# Patient Record
Sex: FEMALE | Race: WHITE | Employment: FULL TIME | URBAN - NONMETROPOLITAN AREA
[De-identification: names, ages, dates, MRNs, and addresses within clinical notes are randomized per-mention and may not be internally consistent; named-entity substitution may affect disease eponyms.]

---

## 2017-03-22 ENCOUNTER — OFFICE VISIT (OUTPATIENT)
Dept: FAMILY MEDICINE CLINIC | Age: 27
End: 2017-03-22
Payer: MEDICAID

## 2017-03-22 VITALS
BODY MASS INDEX: 33.49 KG/M2 | HEIGHT: 63 IN | SYSTOLIC BLOOD PRESSURE: 110 MMHG | WEIGHT: 189 LBS | HEART RATE: 100 BPM | RESPIRATION RATE: 16 BRPM | DIASTOLIC BLOOD PRESSURE: 80 MMHG

## 2017-03-22 DIAGNOSIS — R35.0 FREQUENT URINATION: ICD-10-CM

## 2017-03-22 DIAGNOSIS — F41.1 GENERALIZED ANXIETY DISORDER: Primary | ICD-10-CM

## 2017-03-22 DIAGNOSIS — Z13.1 SCREENING FOR DIABETES MELLITUS: ICD-10-CM

## 2017-03-22 DIAGNOSIS — Z11.4 SCREENING FOR HIV (HUMAN IMMUNODEFICIENCY VIRUS): ICD-10-CM

## 2017-03-22 DIAGNOSIS — Z23 NEED FOR TDAP VACCINATION: ICD-10-CM

## 2017-03-22 LAB
BILIRUBIN, POC: NORMAL
BLOOD URINE, POC: NORMAL
CLARITY, POC: CLEAR
COLOR, POC: YELLOW
GLUCOSE URINE, POC: NORMAL
HBA1C MFR BLD: 5 %
KETONES, POC: NORMAL
LEUKOCYTE EST, POC: NORMAL
NITRITE, POC: NORMAL
PH, POC: 5
PROTEIN, POC: NORMAL
SPECIFIC GRAVITY, POC: 1.02
UROBILINOGEN, POC: 0.2

## 2017-03-22 PROCEDURE — 90715 TDAP VACCINE 7 YRS/> IM: CPT | Performed by: FAMILY MEDICINE

## 2017-03-22 PROCEDURE — 81003 URINALYSIS AUTO W/O SCOPE: CPT | Performed by: FAMILY MEDICINE

## 2017-03-22 PROCEDURE — 90471 IMMUNIZATION ADMIN: CPT | Performed by: FAMILY MEDICINE

## 2017-03-22 PROCEDURE — 83036 HEMOGLOBIN GLYCOSYLATED A1C: CPT | Performed by: FAMILY MEDICINE

## 2017-03-22 PROCEDURE — 99203 OFFICE O/P NEW LOW 30 MIN: CPT | Performed by: FAMILY MEDICINE

## 2017-03-22 RX ORDER — CLONAZEPAM 0.5 MG/1
0.5 TABLET ORAL 2 TIMES DAILY
Qty: 30 TABLET | Refills: 3 | Status: SHIPPED | OUTPATIENT
Start: 2017-03-22 | End: 2017-05-23 | Stop reason: ALTCHOICE

## 2017-03-22 RX ORDER — BUSPIRONE HYDROCHLORIDE 10 MG/1
10 TABLET ORAL 3 TIMES DAILY
Qty: 30 TABLET | Refills: 5 | Status: SHIPPED | OUTPATIENT
Start: 2017-03-22 | End: 2017-05-23 | Stop reason: DRUGHIGH

## 2017-03-22 RX ORDER — CLONAZEPAM 0.5 MG/1
0.5 TABLET ORAL 2 TIMES DAILY PRN
Qty: 30 TABLET | Refills: 1 | Status: SHIPPED | OUTPATIENT
Start: 2017-03-22 | End: 2017-05-23 | Stop reason: SDUPTHER

## 2017-03-22 ASSESSMENT — ENCOUNTER SYMPTOMS
WHEEZING: 0
SINUS PRESSURE: 0
FACIAL SWELLING: 0
NAUSEA: 0
VOMITING: 0
SHORTNESS OF BREATH: 0
RHINORRHEA: 0
BLOOD IN STOOL: 0
BACK PAIN: 0
PHOTOPHOBIA: 0
CHEST TIGHTNESS: 0
EYE DISCHARGE: 0
TROUBLE SWALLOWING: 0
COLOR CHANGE: 0
CHOKING: 0
ABDOMINAL PAIN: 0
APNEA: 0
ANAL BLEEDING: 0
CONSTIPATION: 0
SORE THROAT: 0
EYE PAIN: 0
EYE ITCHING: 0
STRIDOR: 0
COUGH: 0
ABDOMINAL DISTENTION: 0
RECTAL PAIN: 0
ALLERGIC/IMMUNOLOGIC NEGATIVE: 1
EYE REDNESS: 0
VOICE CHANGE: 0
DIARRHEA: 0

## 2017-03-23 ENCOUNTER — HOSPITAL ENCOUNTER (OUTPATIENT)
Age: 27
Discharge: HOME | End: 2017-03-23
Payer: MEDICAID

## 2017-03-23 DIAGNOSIS — Z11.4 SCREENING FOR HIV (HUMAN IMMUNODEFICIENCY VIRUS): ICD-10-CM

## 2017-03-23 LAB
HIV AG/AB: NONREACTIVE
TSH SERPL DL<=0.05 MIU/L-ACNC: 3.41 MIU/L (ref 0.3–5)

## 2017-03-23 PROCEDURE — 87389 HIV-1 AG W/HIV-1&-2 AB AG IA: CPT

## 2017-03-23 PROCEDURE — 84443 ASSAY THYROID STIM HORMONE: CPT

## 2017-03-23 PROCEDURE — 36415 COLL VENOUS BLD VENIPUNCTURE: CPT

## 2017-05-23 ENCOUNTER — OFFICE VISIT (OUTPATIENT)
Dept: FAMILY MEDICINE CLINIC | Age: 27
End: 2017-05-23
Payer: MEDICAID

## 2017-05-23 VITALS
WEIGHT: 190 LBS | HEART RATE: 92 BPM | SYSTOLIC BLOOD PRESSURE: 126 MMHG | DIASTOLIC BLOOD PRESSURE: 68 MMHG | BODY MASS INDEX: 33.66 KG/M2 | HEIGHT: 63 IN

## 2017-05-23 DIAGNOSIS — F41.1 GENERALIZED ANXIETY DISORDER: Primary | ICD-10-CM

## 2017-05-23 PROCEDURE — 99213 OFFICE O/P EST LOW 20 MIN: CPT | Performed by: FAMILY MEDICINE

## 2017-05-23 RX ORDER — CLONAZEPAM 0.5 MG/1
0.5 TABLET ORAL 2 TIMES DAILY
Qty: 30 TABLET | Refills: 0 | Status: SHIPPED | OUTPATIENT
Start: 2017-05-23 | End: 2017-08-29

## 2017-05-23 RX ORDER — BUSPIRONE HYDROCHLORIDE 15 MG/1
15 TABLET ORAL 3 TIMES DAILY
Qty: 90 TABLET | Refills: 2 | Status: SHIPPED | OUTPATIENT
Start: 2017-05-23 | End: 2017-08-29 | Stop reason: SINTOL

## 2017-05-23 ASSESSMENT — ENCOUNTER SYMPTOMS
COLOR CHANGE: 0
WHEEZING: 0
VOICE CHANGE: 0
CHOKING: 0
CONSTIPATION: 0
FACIAL SWELLING: 0
RECTAL PAIN: 0
EYE REDNESS: 0
APNEA: 0
EYE DISCHARGE: 0
VOMITING: 0
RHINORRHEA: 0
CHEST TIGHTNESS: 0
EYE PAIN: 0
SORE THROAT: 0
SINUS PRESSURE: 0
ABDOMINAL PAIN: 0
COUGH: 0
BLOOD IN STOOL: 0
DIARRHEA: 0
ANAL BLEEDING: 0
NAUSEA: 0
ABDOMINAL DISTENTION: 0
PHOTOPHOBIA: 0
STRIDOR: 0
BACK PAIN: 0
SHORTNESS OF BREATH: 0
ALLERGIC/IMMUNOLOGIC NEGATIVE: 1
TROUBLE SWALLOWING: 0
EYE ITCHING: 0

## 2017-05-23 ASSESSMENT — PATIENT HEALTH QUESTIONNAIRE - PHQ9
1. LITTLE INTEREST OR PLEASURE IN DOING THINGS: 0
SUM OF ALL RESPONSES TO PHQ9 QUESTIONS 1 & 2: 0
SUM OF ALL RESPONSES TO PHQ QUESTIONS 1-9: 0
2. FEELING DOWN, DEPRESSED OR HOPELESS: 0

## 2017-06-16 ENCOUNTER — TELEPHONE (OUTPATIENT)
Dept: FAMILY MEDICINE CLINIC | Age: 27
End: 2017-06-16

## 2017-06-30 ENCOUNTER — HOSPITAL ENCOUNTER (EMERGENCY)
Age: 27
Discharge: HOME | End: 2017-06-30
Attending: EMERGENCY MEDICINE
Payer: COMMERCIAL

## 2017-06-30 ENCOUNTER — TELEPHONE (OUTPATIENT)
Dept: FAMILY MEDICINE CLINIC | Age: 27
End: 2017-06-30

## 2017-06-30 VITALS
RESPIRATION RATE: 16 BRPM | OXYGEN SATURATION: 95 % | DIASTOLIC BLOOD PRESSURE: 75 MMHG | HEART RATE: 86 BPM | TEMPERATURE: 98.5 F | SYSTOLIC BLOOD PRESSURE: 111 MMHG

## 2017-06-30 DIAGNOSIS — F41.1 ANXIETY STATE: Primary | ICD-10-CM

## 2017-06-30 PROCEDURE — 93005 ELECTROCARDIOGRAM TRACING: CPT

## 2017-06-30 PROCEDURE — 6370000000 HC RX 637 (ALT 250 FOR IP): Performed by: EMERGENCY MEDICINE

## 2017-06-30 PROCEDURE — 99283 EMERGENCY DEPT VISIT LOW MDM: CPT

## 2017-06-30 RX ORDER — LORAZEPAM 1 MG/1
1 TABLET ORAL EVERY 8 HOURS PRN
Qty: 3 TABLET | Refills: 0 | Status: SHIPPED | OUTPATIENT
Start: 2017-06-30 | End: 2017-07-01

## 2017-06-30 RX ORDER — LORAZEPAM 0.5 MG/1
1 TABLET ORAL ONCE
Status: COMPLETED | OUTPATIENT
Start: 2017-06-30 | End: 2017-06-30

## 2017-06-30 RX ADMIN — LORAZEPAM 1 MG: 0.5 TABLET ORAL at 12:46

## 2017-06-30 ASSESSMENT — ENCOUNTER SYMPTOMS
EYES NEGATIVE: 1
GASTROINTESTINAL NEGATIVE: 1
ALLERGIC/IMMUNOLOGIC NEGATIVE: 1
HYPERVENTILATION: 0
RESPIRATORY NEGATIVE: 1
ABDOMINAL PAIN: 0

## 2017-07-05 LAB
EKG ATRIAL RATE: 92 BPM
EKG P AXIS: 47 DEGREES
EKG P-R INTERVAL: 174 MS
EKG Q-T INTERVAL: 384 MS
EKG QRS DURATION: 82 MS
EKG QTC CALCULATION (BAZETT): 474 MS
EKG R AXIS: 1 DEGREES
EKG T AXIS: 13 DEGREES
EKG VENTRICULAR RATE: 92 BPM

## 2017-08-29 ENCOUNTER — OFFICE VISIT (OUTPATIENT)
Dept: FAMILY MEDICINE CLINIC | Age: 27
End: 2017-08-29
Payer: COMMERCIAL

## 2017-08-29 VITALS — HEIGHT: 63 IN | BODY MASS INDEX: 32.25 KG/M2 | WEIGHT: 182 LBS | RESPIRATION RATE: 16 BRPM

## 2017-08-29 DIAGNOSIS — R55 VASOVAGAL SYNCOPE: Primary | ICD-10-CM

## 2017-08-29 PROCEDURE — 99213 OFFICE O/P EST LOW 20 MIN: CPT | Performed by: FAMILY MEDICINE

## 2017-08-29 ASSESSMENT — ENCOUNTER SYMPTOMS
VOMITING: 0
CHOKING: 0
BLOOD IN STOOL: 0
VOICE CHANGE: 0
EYE DISCHARGE: 0
ABDOMINAL PAIN: 0
SHORTNESS OF BREATH: 0
TROUBLE SWALLOWING: 0
STRIDOR: 0
COLOR CHANGE: 0
DIARRHEA: 0
SORE THROAT: 0
SINUS PRESSURE: 0
CHEST TIGHTNESS: 0
EYE ITCHING: 0
BOWEL INCONTINENCE: 0
APNEA: 0
BACK PAIN: 0
RHINORRHEA: 0
FACIAL SWELLING: 0
PHOTOPHOBIA: 0
ABDOMINAL DISTENTION: 0
NAUSEA: 0
EYE PAIN: 0
ANAL BLEEDING: 0
WHEEZING: 0
EYE REDNESS: 0
ALLERGIC/IMMUNOLOGIC NEGATIVE: 1
RECTAL PAIN: 0
VISUAL CHANGE: 0
COUGH: 0
CONSTIPATION: 0

## 2018-04-10 ENCOUNTER — HOSPITAL ENCOUNTER (EMERGENCY)
Age: 28
Discharge: HOME | End: 2018-04-11
Attending: EMERGENCY MEDICINE
Payer: COMMERCIAL

## 2018-04-10 VITALS
DIASTOLIC BLOOD PRESSURE: 79 MMHG | RESPIRATION RATE: 20 BRPM | OXYGEN SATURATION: 98 % | HEART RATE: 84 BPM | TEMPERATURE: 97.5 F | SYSTOLIC BLOOD PRESSURE: 112 MMHG

## 2018-04-10 DIAGNOSIS — K02.9 DENTAL CARIES: Primary | ICD-10-CM

## 2018-04-10 DIAGNOSIS — K08.89 PAIN, DENTAL: ICD-10-CM

## 2018-04-10 PROCEDURE — 99282 EMERGENCY DEPT VISIT SF MDM: CPT

## 2018-04-10 PROCEDURE — 6370000000 HC RX 637 (ALT 250 FOR IP): Performed by: EMERGENCY MEDICINE

## 2018-04-10 PROCEDURE — 6370000000 HC RX 637 (ALT 250 FOR IP)

## 2018-04-10 RX ORDER — AMOXICILLIN 500 MG/1
500 CAPSULE ORAL 3 TIMES DAILY
Qty: 30 CAPSULE | Refills: 0 | Status: SHIPPED | OUTPATIENT
Start: 2018-04-10 | End: 2018-04-20

## 2018-04-10 RX ORDER — HYDROCODONE BITARTRATE AND ACETAMINOPHEN 5; 325 MG/1; MG/1
2 TABLET ORAL ONCE
Status: DISCONTINUED | OUTPATIENT
Start: 2018-04-11 | End: 2018-04-11 | Stop reason: HOSPADM

## 2018-04-10 RX ORDER — AMOXICILLIN 500 MG/1
500 CAPSULE ORAL ONCE
Status: COMPLETED | OUTPATIENT
Start: 2018-04-11 | End: 2018-04-10

## 2018-04-10 RX ADMIN — AMOXICILLIN 500 MG: 500 CAPSULE ORAL at 23:58

## 2018-04-10 RX ADMIN — LIDOCAINE HYDROCHLORIDE 15 ML: 20 SOLUTION ORAL; TOPICAL at 23:58

## 2018-04-10 ASSESSMENT — PAIN SCALES - GENERAL
PAINLEVEL_OUTOF10: 10
PAINLEVEL_OUTOF10: 10

## 2018-04-11 PROCEDURE — 6370000000 HC RX 637 (ALT 250 FOR IP): Performed by: EMERGENCY MEDICINE

## 2018-04-11 RX ORDER — TRAMADOL HYDROCHLORIDE 50 MG/1
50 TABLET ORAL ONCE
Status: COMPLETED | OUTPATIENT
Start: 2018-04-11 | End: 2018-04-11

## 2018-04-11 RX ADMIN — TRAMADOL HYDROCHLORIDE 50 MG: 50 TABLET, FILM COATED ORAL at 00:05

## 2018-04-11 ASSESSMENT — PAIN SCALES - GENERAL: PAINLEVEL_OUTOF10: 10

## 2018-07-13 ENCOUNTER — HOSPITAL ENCOUNTER (EMERGENCY)
Age: 28
Discharge: HOME | End: 2018-07-13
Attending: INTERNAL MEDICINE
Payer: MEDICAID

## 2018-07-13 VITALS
OXYGEN SATURATION: 99 % | HEIGHT: 66 IN | WEIGHT: 179.9 LBS | BODY MASS INDEX: 28.91 KG/M2 | SYSTOLIC BLOOD PRESSURE: 96 MMHG | TEMPERATURE: 98.2 F | DIASTOLIC BLOOD PRESSURE: 67 MMHG | RESPIRATION RATE: 13 BRPM | HEART RATE: 72 BPM

## 2018-07-13 DIAGNOSIS — F41.1 ANXIETY STATE: Primary | ICD-10-CM

## 2018-07-13 DIAGNOSIS — Z72.0 TOBACCO ABUSE: ICD-10-CM

## 2018-07-13 LAB
ABSOLUTE EOS #: 0.3 K/UL (ref 0–0.4)
ABSOLUTE IMMATURE GRANULOCYTE: NORMAL K/UL (ref 0–0.3)
ABSOLUTE LYMPH #: 2.5 K/UL (ref 1–4.8)
ABSOLUTE MONO #: 0.6 K/UL (ref 0–1)
ALBUMIN SERPL-MCNC: 4.1 G/DL (ref 3.5–5.2)
ALBUMIN/GLOBULIN RATIO: ABNORMAL (ref 1–2.5)
ALP BLD-CCNC: 92 U/L (ref 35–104)
ALT SERPL-CCNC: 18 U/L (ref 5–33)
ANION GAP SERPL CALCULATED.3IONS-SCNC: 10 MMOL/L (ref 9–17)
AST SERPL-CCNC: 20 U/L
BASOPHILS # BLD: 0 % (ref 0–2)
BASOPHILS ABSOLUTE: 0 K/UL (ref 0–0.2)
BILIRUB SERPL-MCNC: <0.1 MG/DL (ref 0.3–1.2)
BUN BLDV-MCNC: 9 MG/DL (ref 6–20)
BUN/CREAT BLD: 12 (ref 9–20)
CALCIUM SERPL-MCNC: 9.8 MG/DL (ref 8.6–10.4)
CHLORIDE BLD-SCNC: 103 MMOL/L (ref 98–107)
CO2: 27 MMOL/L (ref 20–31)
CREAT SERPL-MCNC: 0.74 MG/DL (ref 0.5–0.9)
DIFFERENTIAL TYPE: YES
EKG ATRIAL RATE: 90 BPM
EKG P AXIS: 46 DEGREES
EKG P-R INTERVAL: 196 MS
EKG Q-T INTERVAL: 362 MS
EKG QRS DURATION: 84 MS
EKG QTC CALCULATION (BAZETT): 442 MS
EKG R AXIS: 0 DEGREES
EKG T AXIS: 30 DEGREES
EKG VENTRICULAR RATE: 90 BPM
EOSINOPHILS RELATIVE PERCENT: 4 % (ref 0–5)
GFR AFRICAN AMERICAN: >60 ML/MIN
GFR NON-AFRICAN AMERICAN: >60 ML/MIN
GFR SERPL CREATININE-BSD FRML MDRD: ABNORMAL ML/MIN/{1.73_M2}
GFR SERPL CREATININE-BSD FRML MDRD: ABNORMAL ML/MIN/{1.73_M2}
GLUCOSE BLD-MCNC: 75 MG/DL (ref 70–99)
HCT VFR BLD CALC: 38.3 % (ref 36–46)
HEMOGLOBIN: 13 G/DL (ref 12–16)
IMMATURE GRANULOCYTES: NORMAL %
LYMPHOCYTES # BLD: 31 % (ref 15–40)
MCH RBC QN AUTO: 30.1 PG (ref 26–34)
MCHC RBC AUTO-ENTMCNC: 33.9 G/DL (ref 31–37)
MCV RBC AUTO: 88.9 FL (ref 80–100)
MONOCYTES # BLD: 7 % (ref 4–8)
NRBC AUTOMATED: NORMAL PER 100 WBC
PDW BLD-RTO: 13.9 % (ref 12.1–15.2)
PLATELET # BLD: 300 K/UL (ref 140–450)
PLATELET ESTIMATE: NORMAL
PMV BLD AUTO: NORMAL FL (ref 6–12)
POTASSIUM SERPL-SCNC: 4 MMOL/L (ref 3.7–5.3)
RBC # BLD: 4.31 M/UL (ref 4–5.2)
RBC # BLD: NORMAL 10*6/UL
SEG NEUTROPHILS: 58 % (ref 47–75)
SEGMENTED NEUTROPHILS ABSOLUTE COUNT: 4.8 K/UL (ref 2.5–7)
SODIUM BLD-SCNC: 140 MMOL/L (ref 135–144)
TOTAL PROTEIN: 7 G/DL (ref 6.4–8.3)
TROPONIN INTERP: NORMAL
TROPONIN T: <0.03 NG/ML
WBC # BLD: 8.3 K/UL (ref 3.5–11)
WBC # BLD: NORMAL 10*3/UL

## 2018-07-13 PROCEDURE — 2580000003 HC RX 258: Performed by: INTERNAL MEDICINE

## 2018-07-13 PROCEDURE — 96374 THER/PROPH/DIAG INJ IV PUSH: CPT

## 2018-07-13 PROCEDURE — 93005 ELECTROCARDIOGRAM TRACING: CPT

## 2018-07-13 PROCEDURE — 36415 COLL VENOUS BLD VENIPUNCTURE: CPT

## 2018-07-13 PROCEDURE — 85025 COMPLETE CBC W/AUTO DIFF WBC: CPT

## 2018-07-13 PROCEDURE — 6360000002 HC RX W HCPCS: Performed by: INTERNAL MEDICINE

## 2018-07-13 PROCEDURE — 84484 ASSAY OF TROPONIN QUANT: CPT

## 2018-07-13 PROCEDURE — 99285 EMERGENCY DEPT VISIT HI MDM: CPT

## 2018-07-13 PROCEDURE — 80053 COMPREHEN METABOLIC PANEL: CPT

## 2018-07-13 RX ORDER — IBUPROFEN 200 MG
400 TABLET ORAL EVERY 6 HOURS PRN
COMMUNITY

## 2018-07-13 RX ORDER — LORAZEPAM 2 MG/ML
0.5 INJECTION INTRAMUSCULAR ONCE
Status: COMPLETED | OUTPATIENT
Start: 2018-07-13 | End: 2018-07-13

## 2018-07-13 RX ORDER — ALPRAZOLAM 0.25 MG/1
0.25 TABLET ORAL 3 TIMES DAILY PRN
Qty: 10 TABLET | Refills: 0 | Status: SHIPPED | OUTPATIENT
Start: 2018-07-13 | End: 2018-08-12

## 2018-07-13 RX ORDER — 0.9 % SODIUM CHLORIDE 0.9 %
500 INTRAVENOUS SOLUTION INTRAVENOUS ONCE
Status: DISCONTINUED | OUTPATIENT
Start: 2018-07-13 | End: 2018-07-13

## 2018-07-13 RX ORDER — 0.9 % SODIUM CHLORIDE 0.9 %
1000 INTRAVENOUS SOLUTION INTRAVENOUS ONCE
Status: COMPLETED | OUTPATIENT
Start: 2018-07-13 | End: 2018-07-13

## 2018-07-13 RX ADMIN — SODIUM CHLORIDE 1000 ML: 9 INJECTION, SOLUTION INTRAVENOUS at 20:46

## 2018-07-13 RX ADMIN — LORAZEPAM 0.5 MG: 2 INJECTION INTRAMUSCULAR; INTRAVENOUS at 21:14

## 2018-07-13 ASSESSMENT — PAIN DESCRIPTION - PAIN TYPE: TYPE: ACUTE PAIN

## 2018-07-13 ASSESSMENT — PAIN DESCRIPTION - FREQUENCY: FREQUENCY: INTERMITTENT

## 2018-07-13 ASSESSMENT — PAIN DESCRIPTION - ORIENTATION: ORIENTATION: MID

## 2018-07-13 ASSESSMENT — PAIN DESCRIPTION - DIRECTION: RADIATING_TOWARDS: DENIES

## 2018-07-13 ASSESSMENT — PAIN DESCRIPTION - LOCATION: LOCATION: CHEST

## 2018-07-13 ASSESSMENT — PAIN DESCRIPTION - ONSET: ONSET: GRADUAL

## 2018-07-13 ASSESSMENT — PAIN DESCRIPTION - PROGRESSION: CLINICAL_PROGRESSION: NOT CHANGED

## 2018-07-13 ASSESSMENT — PAIN SCALES - GENERAL: PAINLEVEL_OUTOF10: 6

## 2018-07-13 ASSESSMENT — PAIN DESCRIPTION - DESCRIPTORS: DESCRIPTORS: POUNDING

## 2018-07-13 NOTE — ED NOTES
This nurse attempted IV starts x2, both unsuccessful. Pt tolerated well. Dr. Karri Good notified.        [address], RN  07/13/18 2520

## 2018-07-13 NOTE — ED NOTES
Divya chest pain started around 2 pm while painting. Divya took nap for couple hours then woke up and boyfriend called squad since she still didn't feel well. Divya has shortness of breath and some lightheadedness today. States \"I feel like I should be worried about something\". Divya has anxiety issues.      Kailee Zhong RN  07/13/18 8355

## 2018-07-14 NOTE — ED PROVIDER NOTES
oropharyngeal exudate noted. Eyes: Conjunctivae and EOM are normal. Pupils are equal, round, and reactive to light. No scleral icterus. Neck: Normal range of motion. Neck supple. No tracheal deviation present. Cardiovascular: Normal rate, regular rhythm, normal heart sounds and intact distal pulses. Exam reveals no gallop and no friction rub. No murmur heard. Pulmonary/Chest: Effort normal and breath sounds are symmetric and normal. No respiratory distress. There are no wheezes, rales or rhonchi. No tenderness is exhibited. Abdominal: Soft. Bowel sounds are normal. No distension or no mass exhibitted. There is no tenderness, rebound, rigidity or guarding. Genitourinary:   No CVA tenderness   Musculoskeletal: Normal range of motion. No edema, tenderness or deformity. Lymphadenopathy:  No cervical adenopathy. Neurological:   alert and oriented to person, place, and time. Age-appropriate behavior. Reflexes are normal.  There are no cranial nerve deficits. Normal muscle tone, motor and sensory function exhibitted. Coordination normal.   Skin: Skin is warm and dry. No rash noted. No diaphoresis. No erythema. No pallor. Psychiatric:  Anxious, normal affect. Behavior is age-appropriate and normal. Judgment and thought content normal.       DIAGNOSTIC RESULTS     EKG: All EKG's are interpreted by the Emergency Department Physician who either signs or Co-signs this chart in the absence of a cardiologist.    Twelve-lead EKG performed at 1736 shows sinus rhythm with sinus arrhythmia. Ventricular rate is 90 bpm. There is normal [place] interval, QS duration, QT, QTC and axis. Nonspecific changes are noted in the anterior waves.       RADIOLOGY:   Non-plain film images such as CT, Ultrasound and MRI are read by the radiologist.   Interpretation per the Radiologist below, if available at the time of this note:    No orders to display         ED BEDSIDE ULTRASOUND:   Performed by ED Physician - none    LABS:  Labs Reviewed   COMPREHENSIVE METABOLIC PANEL - Abnormal; Notable for the following:        Result Value    Total Bilirubin <0.10 (*)     All other components within normal limits   CBC WITH AUTO DIFFERENTIAL   TROPONIN   PREGNANCY, URINE       All other labs were within normal range or not returned as of this dictation. EMERGENCY DEPARTMENT COURSE and DIFFERENTIAL DIAGNOSIS/MDM:   Vitals:    Vitals:    07/13/18 1833 07/13/18 1848 07/13/18 2104 07/13/18 2118   BP: (!) 91/27 (!) 93/58 (!) 98/58 96/67   Pulse: 85 81 73 72   Resp: 16 15 15 13   Temp:       TempSrc:       SpO2: 100% 99% 100% 99%   Weight:       Height:           Noted    MDM    CRITICAL CARE TIME   Total Critical Care time was 0 minutes. EDCOURSE       CONSULTS:  None    PROCEDURES:  Unless otherwise noted below, none     Procedures      Summation    Patient with anxiety, improved after antianxiety lytics. We will provide her a small prescription for PRN Maxcine Hernandez   She has no SI/HI and has little risk for self harm. She was given instructions to follow up with behavioral health. Patient Course:        ED Medications administered this visit:    Medications   LORazepam (ATIVAN) injection 0.5 mg (0.5 mg Intravenous Given 7/13/18 2114)   0.9 % sodium chloride bolus (0 mLs Intravenous Stopped 7/13/18 2154)       New Prescriptions from this visit:    Discharge Medication List as of 7/13/2018  8:56 PM      START taking these medications    Details   ALPRAZolam (XANAX) 0.25 MG tablet Take 1 tablet by mouth 3 times daily as needed for Anxiety for up to 30 days. ., Disp-10 tablet, R-0Print             Follow-up:  [facility]  [address] [facility] [address] [phone]    As needed, If symptoms worsen      Behavioral HEALTH  In 3 days          Final Impression:   1. Anxiety state    2.  Tobacco abuse               (Please note that portions of this note were completed with a voice recognition program.  Efforts were made to edit the dictations but occasionally words are mis-transcribed.)    FINAL IMPRESSION      1. Anxiety state    2. Tobacco abuse          DISPOSITION/PLAN   DISPOSITION Decision To Discharge 07/13/2018 08:55:02 PM      PATIENT REFERRED TO:  [facility] ED  [address] [address]  [phone]    As needed, If symptoms worsen      Behavioral HEALTH  In 3 days        DISCHARGE MEDICATIONS:  Discharge Medication List as of 7/13/2018  8:56 PM      START taking these medications    Details   ALPRAZolam (XANAX) 0.25 MG tablet Take 1 tablet by mouth 3 times daily as needed for Anxiety for up to 30 days. ., Disp-10 tablet, R-0Print                (Please note that portions of this note were completed with a voice recognition program.  Efforts were made to edit the dictations but occasionally words are mis-transcribed.)    Héctor Escobar MD (electronically signed)  Attending Emergency Physician           Héctor Escobar MD  07/14/18 8538

## 2018-09-21 ENCOUNTER — HOSPITAL ENCOUNTER (EMERGENCY)
Age: 28
Discharge: HOME | End: 2018-09-21
Attending: EMERGENCY MEDICINE
Payer: MEDICAID

## 2018-09-21 VITALS
TEMPERATURE: 98.5 F | RESPIRATION RATE: 20 BRPM | HEART RATE: 107 BPM | HEIGHT: 63 IN | BODY MASS INDEX: 35.97 KG/M2 | DIASTOLIC BLOOD PRESSURE: 77 MMHG | OXYGEN SATURATION: 100 % | WEIGHT: 203 LBS | SYSTOLIC BLOOD PRESSURE: 136 MMHG

## 2018-09-21 DIAGNOSIS — F13.930 BENZODIAZEPINE WITHDRAWAL WITHOUT COMPLICATION (HCC): Primary | ICD-10-CM

## 2018-09-21 PROCEDURE — 6370000000 HC RX 637 (ALT 250 FOR IP)

## 2018-09-21 PROCEDURE — 99283 EMERGENCY DEPT VISIT LOW MDM: CPT

## 2018-09-21 RX ORDER — ALPRAZOLAM 0.25 MG/1
TABLET ORAL
Status: COMPLETED
Start: 2018-09-21 | End: 2018-09-21

## 2018-09-21 RX ORDER — ALPRAZOLAM 0.25 MG/1
0.5 TABLET ORAL NIGHTLY PRN
Status: DISCONTINUED | OUTPATIENT
Start: 2018-09-21 | End: 2018-09-21 | Stop reason: HOSPADM

## 2018-09-21 RX ADMIN — ALPRAZOLAM 0.5 MG: 0.25 TABLET ORAL at 10:04

## 2018-09-21 ASSESSMENT — ENCOUNTER SYMPTOMS
COUGH: 0
TROUBLE SWALLOWING: 0
NAUSEA: 0
BACK PAIN: 0
EYE REDNESS: 0
SORE THROAT: 0
EYE PAIN: 0
VOMITING: 0
SHORTNESS OF BREATH: 0
ABDOMINAL PAIN: 0
DIARRHEA: 0
COLOR CHANGE: 0

## 2018-09-21 NOTE — PROGRESS NOTES
SW in to see pt. Pt wanting to establish PCP - pt was referred to [facility] in July - she had one late cancel and one no show. She never attempted to reschedule. MPC to speak with provider to see if he will attempt to see her again.       ABUNDIO Palmer  9/21/2018

## 2018-09-21 NOTE — ED PROVIDER NOTES
[facility] ED  eMERGENCY dEPARTMENT eNCOUnter      Pt Name: Moiz Paz  MRN: [id number]  [place] 1990  Date of evaluation: 9/21/2018  Provider: Tod Villegas MD    CHIEF COMPLAINT       Chief Complaint   Patient presents with    Withdrawal     pt states has not had any xanax for 2 days and feels like she is in withdrawl, was previously taking 1mg TID      Patient is a 80-year-old female presents to the emergency department complaining of feeling like she is in withdrawal.  She states that she has not taken any Xanax for 2 days and feels like she is withdrawing. She denies any chest pain or shortness of breath. She denies any fever or chills. She states she just feels anxious and sick. Patient states she was getting Xanax prescriptions and now is out and that's why she's here. Nursing Notes were reviewed. REVIEW OF SYSTEMS    (2-9 systems for level 4, 10 or more for level 5)     Review of Systems   Constitutional: Negative for chills and fever. HENT: Negative for ear pain, sore throat and trouble swallowing. Eyes: Negative for pain and redness. Respiratory: Negative for cough and shortness of breath. Cardiovascular: Negative for chest pain and palpitations. Gastrointestinal: Negative for abdominal pain, diarrhea, nausea and vomiting. Genitourinary: Negative for dysuria and frequency. Musculoskeletal: Negative for back pain and neck pain. Skin: Negative for color change and rash. Neurological: Negative for dizziness, syncope and headaches. Psychiatric/Behavioral: Negative for hallucinations and suicidal ideas. The patient is nervous/anxious. Except as noted above the remainder of the review of systems was reviewed and negative.        PAST MEDICAL HISTORY     Past Medical History:   Diagnosis Date    Anxiety          SURGICAL HISTORY       Past Surgical History:   Procedure Laterality Date    DENTAL SURGERY  march 5, 2016    tooth extraction 15 teeth    DENTAL SURGERY      duplicate          ALLERGIES     Buspar [buspirone] and Codeine    FAMILY HISTORY       Family History   Problem Relation Age of Onset    Other Mother         kidney stones          SOCIAL HISTORY       Social History     Social History    Marital status: Legally      Spouse name: N/A    Number of children: N/A    Years of education: N/A     Social History Main Topics    Smoking status: Current Every Day Smoker     Packs/day: 0.50     Years: 9.00     Types: Cigarettes    Smokeless tobacco: Never Used    Alcohol use No    Drug use: No    Sexual activity: Not Asked     Other Topics Concern    None     Social History Narrative    None           PHYSICAL EXAM    (up to 7 for level 4, 8 or more for level 5)     ED Triage Vitals [09/21/18 0931]   BP Temp Temp Source Pulse Resp SpO2 Height Weight   136/77 98.5 °F (36.9 °C) Oral 107 20 100 % 5' 3\" (1.6 m) 203 lb (92.1 kg)       Physical Exam     Physical    Vital signs and nursing notes were reviewed as well as the social, family, and past medical history. Gen. appearance: Patient is alert and oriented and in no distress secondary to withdrawal    Head: Atraumatic, normocephalic    Neck: Supple, trachea/thyroid normal    EENT: PERRLA, EOMI, conjunctiva normal.    Skin: Warm and dry with no rash    Cardiovascular: Heart RRR, no gallops or rubs, no aortic enlargement or bruits noted. Respiratory: Lungs clear, no wheezing, no rales, normal breath sounds. Gastrointestinal: Abdomen nontender, bowel sounds normal, no rebound/guarding/distention or mass    Musculoskeletal: No tenderness in the extremities, no back or hip pain. Neurological: Patient is alert and oriented ×3, no focal motor or sensory deficits noted, reflexes normal, NIH = 0      DIAGNOSTIC RESULTS             LABS:  Labs Reviewed - No data to display    All other labs were within normal range or not returned as of this dictation.     EMERGENCY DEPARTMENT COURSE and

## 2018-12-02 ENCOUNTER — HOSPITAL ENCOUNTER (EMERGENCY)
Age: 28
Discharge: HOME | End: 2018-12-02
Attending: FAMILY MEDICINE
Payer: COMMERCIAL

## 2018-12-02 VITALS
DIASTOLIC BLOOD PRESSURE: 72 MMHG | RESPIRATION RATE: 18 BRPM | SYSTOLIC BLOOD PRESSURE: 122 MMHG | WEIGHT: 202 LBS | HEIGHT: 63 IN | TEMPERATURE: 97.9 F | OXYGEN SATURATION: 97 % | HEART RATE: 99 BPM | BODY MASS INDEX: 35.79 KG/M2

## 2018-12-02 DIAGNOSIS — S61.512A LACERATION OF LEFT WRIST, INITIAL ENCOUNTER: Primary | ICD-10-CM

## 2018-12-02 PROCEDURE — 6370000000 HC RX 637 (ALT 250 FOR IP): Performed by: FAMILY MEDICINE

## 2018-12-02 PROCEDURE — 99283 EMERGENCY DEPT VISIT LOW MDM: CPT

## 2018-12-02 PROCEDURE — 2500000003 HC RX 250 WO HCPCS: Performed by: FAMILY MEDICINE

## 2018-12-02 PROCEDURE — 12001 RPR S/N/AX/GEN/TRNK 2.5CM/<: CPT

## 2018-12-02 RX ORDER — SULFAMETHOXAZOLE AND TRIMETHOPRIM 800; 160 MG/1; MG/1
1 TABLET ORAL 2 TIMES DAILY
Qty: 20 TABLET | Refills: 0 | Status: SHIPPED | OUTPATIENT
Start: 2018-12-02 | End: 2018-12-12

## 2018-12-02 RX ORDER — DIAPER,BRIEF,INFANT-TODD,DISP
EACH MISCELLANEOUS ONCE
Status: COMPLETED | OUTPATIENT
Start: 2018-12-02 | End: 2018-12-02

## 2018-12-02 RX ORDER — SULFAMETHOXAZOLE AND TRIMETHOPRIM 800; 160 MG/1; MG/1
1 TABLET ORAL ONCE
Status: COMPLETED | OUTPATIENT
Start: 2018-12-02 | End: 2018-12-02

## 2018-12-02 RX ORDER — LIDOCAINE HYDROCHLORIDE 10 MG/ML
5 INJECTION, SOLUTION INFILTRATION; PERINEURAL ONCE
Status: COMPLETED | OUTPATIENT
Start: 2018-12-02 | End: 2018-12-02

## 2018-12-02 RX ADMIN — LIDOCAINE HYDROCHLORIDE 5 ML: 10 INJECTION, SOLUTION INFILTRATION; PERINEURAL at 00:22

## 2018-12-02 RX ADMIN — BACITRACIN ZINC 1 G: 500 OINTMENT TOPICAL at 00:41

## 2018-12-02 RX ADMIN — SULFAMETHOXAZOLE AND TRIMETHOPRIM 1 TABLET: 800; 160 TABLET ORAL at 00:41

## 2018-12-02 ASSESSMENT — PAIN DESCRIPTION - ORIENTATION: ORIENTATION: LEFT

## 2018-12-02 ASSESSMENT — PAIN DESCRIPTION - PAIN TYPE: TYPE: ACUTE PAIN

## 2018-12-02 ASSESSMENT — PAIN DESCRIPTION - ONSET: ONSET: SUDDEN

## 2018-12-02 ASSESSMENT — PAIN DESCRIPTION - PROGRESSION: CLINICAL_PROGRESSION: NOT CHANGED

## 2018-12-02 ASSESSMENT — PAIN DESCRIPTION - LOCATION: LOCATION: ARM

## 2018-12-02 ASSESSMENT — PAIN DESCRIPTION - DESCRIPTORS: DESCRIPTORS: SHARP

## 2018-12-02 ASSESSMENT — PAIN DESCRIPTION - FREQUENCY: FREQUENCY: CONTINUOUS

## 2018-12-02 ASSESSMENT — PAIN SCALES - GENERAL
PAINLEVEL_OUTOF10: 5
PAINLEVEL_OUTOF10: 4

## 2018-12-02 NOTE — ED PROVIDER NOTES
discussed wound care and outpatient suture removal, patient acknowledges. See procedure note below    PROCEDURES:  Lac Repair  Date/Time: 12/2/2018 2:19 AM  Performed by: Rimma Hills  Authorized by: Rimma Hills     Consent:     Consent obtained:  Verbal    Consent given by:  Patient    Risks discussed:  Pain and poor cosmetic result  Anesthesia (see MAR for exact dosages): Anesthesia method:  Local infiltration    Local anesthetic:  Lidocaine 1% w/o epi  Laceration details:     Location:  Shoulder/arm    Shoulder/arm location:  L lower arm    Length (cm):  2.5 (Irregular avulsion type laceration)  Repair type:     Repair type:  Simple  Pre-procedure details:     Preparation:  Patient was prepped and draped in usual sterile fashion  Exploration:     Hemostasis achieved with:  Direct pressure    Wound exploration: wound explored through full range of motion and entire depth of wound probed and visualized      Wound extent: no foreign bodies/material noted, no tendon damage noted and no vascular damage noted    Treatment:     Area cleansed with:  Betadine and saline    Amount of cleaning:  Standard    Irrigation solution:  Sterile saline    Irrigation method:  Syringe  Skin repair:     Repair method:  Sutures    Suture size:  4-0    Suture material:  Nylon    Suture technique:  Simple interrupted    Number of sutures:  4  Approximation:     Approximation:  Close    Vermilion border: well-aligned    Post-procedure details:     Dressing:  Antibiotic ointment and sterile dressing    Patient tolerance of procedure: Tolerated well, no immediate complications        FINAL IMPRESSION      1.  Laceration of left wrist, initial encounter          DISPOSITION/PLAN   Discharge    PATIENT REFERRED TO:  Sunil Moreno  [address] [place] [address]  [phone]    For suture removal in 7-10 days      DISCHARGE MEDICATIONS:  New Prescriptions    SULFAMETHOXAZOLE-TRIMETHOPRIM

## 2021-10-04 ENCOUNTER — HOSPITAL ENCOUNTER (EMERGENCY)
Age: 31
Discharge: HOME | End: 2021-10-04
Attending: EMERGENCY MEDICINE
Payer: MEDICAID

## 2021-10-04 VITALS
DIASTOLIC BLOOD PRESSURE: 74 MMHG | SYSTOLIC BLOOD PRESSURE: 108 MMHG | WEIGHT: 190 LBS | HEIGHT: 63 IN | BODY MASS INDEX: 33.66 KG/M2 | TEMPERATURE: 98 F | HEART RATE: 80 BPM | OXYGEN SATURATION: 96 % | RESPIRATION RATE: 18 BRPM

## 2021-10-04 DIAGNOSIS — R10.11 RIGHT UPPER QUADRANT ABDOMINAL PAIN: Primary | ICD-10-CM

## 2021-10-04 LAB
ABSOLUTE EOS #: 0.1 K/UL (ref 0–0.4)
ABSOLUTE IMMATURE GRANULOCYTE: NORMAL K/UL (ref 0–0.3)
ABSOLUTE LYMPH #: 2.1 K/UL (ref 1–4.8)
ABSOLUTE MONO #: 0.5 K/UL (ref 0–1)
ALBUMIN SERPL-MCNC: 4.2 G/DL (ref 3.5–5.2)
ALBUMIN/GLOBULIN RATIO: ABNORMAL (ref 1–2.5)
ALP BLD-CCNC: 95 U/L (ref 35–104)
ALT SERPL-CCNC: 63 U/L (ref 5–33)
ANION GAP SERPL CALCULATED.3IONS-SCNC: 10 MMOL/L (ref 9–17)
AST SERPL-CCNC: 49 U/L
BASOPHILS # BLD: 0 % (ref 0–2)
BASOPHILS ABSOLUTE: 0 K/UL (ref 0–0.2)
BILIRUB SERPL-MCNC: 0.11 MG/DL (ref 0.3–1.2)
BILIRUBIN URINE: NEGATIVE
BUN BLDV-MCNC: 11 MG/DL (ref 6–20)
BUN/CREAT BLD: 17 (ref 9–20)
CALCIUM SERPL-MCNC: 9.3 MG/DL (ref 8.6–10.4)
CHLORIDE BLD-SCNC: 105 MMOL/L (ref 98–107)
CO2: 23 MMOL/L (ref 20–31)
COLOR: YELLOW
COMMENT UA: NORMAL
CREAT SERPL-MCNC: 0.63 MG/DL (ref 0.5–0.9)
DIFFERENTIAL TYPE: YES
EOSINOPHILS RELATIVE PERCENT: 2 % (ref 0–5)
GFR AFRICAN AMERICAN: >60 ML/MIN
GFR NON-AFRICAN AMERICAN: >60 ML/MIN
GFR SERPL CREATININE-BSD FRML MDRD: ABNORMAL ML/MIN/{1.73_M2}
GFR SERPL CREATININE-BSD FRML MDRD: ABNORMAL ML/MIN/{1.73_M2}
GLUCOSE BLD-MCNC: 93 MG/DL (ref 70–99)
GLUCOSE URINE: NEGATIVE
HCG(URINE) PREGNANCY TEST: NEGATIVE
HCT VFR BLD CALC: 38.2 % (ref 36–46)
HEMOGLOBIN: 13 G/DL (ref 12–16)
IMMATURE GRANULOCYTES: NORMAL %
KETONES, URINE: NEGATIVE
LEUKOCYTE ESTERASE, URINE: NEGATIVE
LYMPHOCYTES # BLD: 28 % (ref 15–40)
MCH RBC QN AUTO: 31.2 PG (ref 26–34)
MCHC RBC AUTO-ENTMCNC: 34 G/DL (ref 31–37)
MCV RBC AUTO: 91.9 FL (ref 80–100)
MONOCYTES # BLD: 7 % (ref 4–8)
NITRITE, URINE: NEGATIVE
NRBC AUTOMATED: NORMAL PER 100 WBC
PDW BLD-RTO: 13.7 % (ref 12.1–15.2)
PH UA: 5 (ref 5–8)
PLATELET # BLD: 289 K/UL (ref 140–450)
PLATELET ESTIMATE: NORMAL
PMV BLD AUTO: NORMAL FL (ref 6–12)
POTASSIUM SERPL-SCNC: 4.2 MMOL/L (ref 3.7–5.3)
PROTEIN UA: NEGATIVE
RBC # BLD: 4.16 M/UL (ref 4–5.2)
RBC # BLD: NORMAL 10*6/UL
SEG NEUTROPHILS: 63 % (ref 47–75)
SEGMENTED NEUTROPHILS ABSOLUTE COUNT: 4.6 K/UL (ref 2.5–7)
SODIUM BLD-SCNC: 138 MMOL/L (ref 135–144)
SPECIFIC GRAVITY UA: 1.02 (ref 1–1.03)
TOTAL PROTEIN: 7 G/DL (ref 6.4–8.3)
TURBIDITY: CLEAR
URINE HGB: NEGATIVE
UROBILINOGEN, URINE: NORMAL
WBC # BLD: 7.4 K/UL (ref 3.5–11)
WBC # BLD: NORMAL 10*3/UL

## 2021-10-04 PROCEDURE — 99283 EMERGENCY DEPT VISIT LOW MDM: CPT

## 2021-10-04 PROCEDURE — 81003 URINALYSIS AUTO W/O SCOPE: CPT

## 2021-10-04 PROCEDURE — 85025 COMPLETE CBC W/AUTO DIFF WBC: CPT

## 2021-10-04 PROCEDURE — 36415 COLL VENOUS BLD VENIPUNCTURE: CPT

## 2021-10-04 PROCEDURE — 81025 URINE PREGNANCY TEST: CPT

## 2021-10-04 PROCEDURE — 76705 ECHO EXAM OF ABDOMEN: CPT

## 2021-10-04 PROCEDURE — 80053 COMPREHEN METABOLIC PANEL: CPT

## 2021-10-04 RX ORDER — ALPRAZOLAM 0.5 MG/1
0.5 TABLET ORAL 4 TIMES DAILY
COMMUNITY

## 2021-10-04 RX ORDER — ESCITALOPRAM OXALATE 5 MG/1
5 TABLET ORAL DAILY
COMMUNITY

## 2021-10-04 RX ORDER — CLONIDINE HYDROCHLORIDE 0.1 MG/1
0.1 TABLET ORAL NIGHTLY
COMMUNITY

## 2021-10-04 ASSESSMENT — PAIN DESCRIPTION - DESCRIPTORS: DESCRIPTORS: PRESSURE

## 2021-10-04 ASSESSMENT — PAIN DESCRIPTION - LOCATION: LOCATION: ABDOMEN

## 2021-10-04 ASSESSMENT — PAIN DESCRIPTION - ORIENTATION: ORIENTATION: RIGHT;UPPER

## 2021-10-04 ASSESSMENT — PAIN SCALES - GENERAL: PAINLEVEL_OUTOF10: 6

## 2021-10-04 ASSESSMENT — PAIN DESCRIPTION - PAIN TYPE: TYPE: ACUTE PAIN

## 2021-10-04 NOTE — LETTER
[facility] ED  [address], [address]  Phone: [phone]               October 4, 2021    Patient: Yumi Boston   YOB: 1990   Date of Visit: 10/4/2021       To Whom It May Concern:    Abhinav Aleman was seen and treated in our emergency department on 10/4/2021. She may return to work on 10/5/21.       Sincerely,       Gildardo Ramirez RN         Signature:__________________________________

## 2021-10-04 NOTE — ED PROVIDER NOTES
eMERGENCY dEPARTMENT eNCOUnter      [address] [facility]    Chief Complaint   Patient presents with    Abdominal Pain     RUQ ABDOMINAL PAIN FOR PAST WEEK. PT C/O PAIN RADIATING TO HER BACK. DENIES URINARY SYMPTOMS. HPI    Kendrick Arboleda is a 32 y.o. female who presents to ED from home. By car. With complaint of right upper quadrant abdominal pain, off and on for the past week. The pain appears to be worse after meals  Onset x1 week intermittent right upper quadrant abdominal pain  Intensity of symptoms severe intermittent right upper quadrant abdominal pain. Patient complains of nausea no vomiting. Patient denies fever chills denies dysuria. Patient takes Metformin for fertility. Last menstrual period was September 19. The pain appears to be intermittent. The pain appears to radiate to her right flank    REVIEW OF SYSTEMS    All systems reviewed and positives are in the HPI    PAST MEDICAL HISTORY    Past Medical History:   Diagnosis Date    Anxiety        SURGICAL HISTORY    Past Surgical History:   Procedure Laterality Date    DENTAL SURGERY  march 5, 2016    tooth extraction 15 teeth    DENTAL SURGERY      duplicate        CURRENT MEDICATIONS    Current Outpatient Rx   Medication Sig Dispense Refill    ALPRAZolam (XANAX) 0.5 MG tablet Take 0.5 mg by mouth 4 times daily.       cloNIDine (CATAPRES) 0.1 MG tablet Take 0.1 mg by mouth daily      escitalopram (LEXAPRO) 5 MG tablet Take 5 mg by mouth daily      metFORMIN (GLUCOPHAGE) 500 MG tablet Take 500 mg by mouth 2 times daily (with meals)      ibuprofen (ADVIL;MOTRIN) 200 MG tablet Take 400 mg by mouth every 6 hours as needed for Pain         ALLERGIES    Allergies   Allergen Reactions    Buspar [Buspirone] Swelling    Codeine Other (See Comments)     Pt states it makes me feel sick    Penicillin G        FAMILY HISTORY    Family History   Problem Relation Age of Onset    Other Mother         kidney stones       SOCIAL HISTORY    Social Cardiovascular:  Normal rate, normal rhythm, no murmurs, no gallops, no rubs   GI: Right upper quadrant tenderness. Negative Escobar sign. : No CVA tenderness to percussion  Musculoskeletal:  No edema   Integument:  Well hydrated   Neurologic:  Alert & oriented x 3, no focal deficits     EKG        RADIOLOGY/PROCEDURES    No orders to display     Labs  Labs Reviewed   COMPREHENSIVE METABOLIC PANEL - Abnormal; Notable for the following components:       Result Value    ALT 63 (*)     AST 49 (*)     Total Bilirubin 0.11 (*)     All other components within normal limits   CBC WITH AUTO DIFFERENTIAL   URINALYSIS   PREGNANCY, URINE           Summation      Patient Course: Patient has mildly elevated liver enzymes. The labs were discussed with the patient. Patient will be scheduled for gallbladder ultrasound in a.m. Follow-up with a surgeon. Return to ED if worse. Low-fat diet as discussed. ED Medications administered this visit:  Medications - No data to display    New Prescriptions from this visit:    New Prescriptions    No medications on file       Follow-up:  No follow-up provider specified. Final Impression:   1.  Right upper quadrant abdominal pain               (Please note that portions of this note were completed with a voice recognition program.  Efforts were made to edit the dictations but occasionally words are mis-transcribed.)      (Please note that portions of this note were completed with a voice recognition program.  Efforts were made to edit the dictations but occasionally words are mis-transcribed.)      Ramonita Madsen MD  10/04/21 1122       Ramonita Madsen MD  10/04/21 1125

## 2021-10-05 ENCOUNTER — TELEPHONE (OUTPATIENT)
Dept: SURGERY | Age: 31
End: 2021-10-05

## 2021-10-06 ENCOUNTER — HOSPITAL ENCOUNTER (OUTPATIENT)
Dept: ULTRASOUND IMAGING | Age: 31
Discharge: HOME | End: 2021-10-08
Payer: MEDICAID

## 2021-10-06 DIAGNOSIS — R10.11 RIGHT UPPER QUADRANT ABDOMINAL PAIN: ICD-10-CM

## 2021-10-06 PROCEDURE — 76705 ECHO EXAM OF ABDOMEN: CPT

## 2021-10-07 ENCOUNTER — OFFICE VISIT (OUTPATIENT)
Dept: SURGERY | Age: 31
End: 2021-10-07
Payer: MEDICAID

## 2021-10-07 DIAGNOSIS — K80.00 CALCULUS OF GALLBLADDER WITH ACUTE CHOLECYSTITIS WITHOUT OBSTRUCTION: Primary | ICD-10-CM

## 2021-10-07 DIAGNOSIS — K76.0 HEPATIC STEATOSIS: ICD-10-CM

## 2021-10-07 DIAGNOSIS — Z72.0 TOBACCO ABUSE: ICD-10-CM

## 2021-10-07 DIAGNOSIS — R16.0 HEPATOMEGALY: ICD-10-CM

## 2021-10-07 PROCEDURE — G8484 FLU IMMUNIZE NO ADMIN: HCPCS | Performed by: SURGERY

## 2021-10-07 PROCEDURE — G8428 CUR MEDS NOT DOCUMENT: HCPCS | Performed by: SURGERY

## 2021-10-07 PROCEDURE — 99203 OFFICE O/P NEW LOW 30 MIN: CPT | Performed by: SURGERY

## 2021-10-07 PROCEDURE — 4004F PT TOBACCO SCREEN RCVD TLK: CPT | Performed by: SURGERY

## 2021-10-07 PROCEDURE — G8417 CALC BMI ABV UP PARAM F/U: HCPCS | Performed by: SURGERY

## 2021-10-07 NOTE — LETTER
[address] [address] [address]  Phone: [phone]  Fax: [phone]    Seng Ron MD        October 7, 2021     Patient: Claudio Quesada   YOB: 1990   Date of Visit: 10/7/2021       To Whom it May Concern:    Primitivo Bal was seen in my clinic on 10/7/2021. She may return to work without restrictions on 10/08/2021. If you have any questions or concerns, please don't hesitate to call.     Sincerely,         Seng Ron MD

## 2021-10-11 ENCOUNTER — HOSPITAL ENCOUNTER (OUTPATIENT)
Dept: PREADMISSION TESTING | Age: 31
Setting detail: OUTPATIENT SURGERY
Discharge: HOME | End: 2021-10-15
Payer: MEDICAID

## 2021-10-11 DIAGNOSIS — Z01.818 PREOP TESTING: Primary | ICD-10-CM

## 2021-10-11 PROCEDURE — C9803 HOPD COVID-19 SPEC COLLECT: HCPCS

## 2021-10-11 PROCEDURE — U0005 INFEC AGEN DETEC AMPLI PROBE: HCPCS

## 2021-10-11 PROCEDURE — U0003 INFECTIOUS AGENT DETECTION BY NUCLEIC ACID (DNA OR RNA); SEVERE ACUTE RESPIRATORY SYNDROME CORONAVIRUS 2 (SARS-COV-2) (CORONAVIRUS DISEASE [COVID-19]), AMPLIFIED PROBE TECHNIQUE, MAKING USE OF HIGH THROUGHPUT TECHNOLOGIES AS DESCRIBED BY CMS-2020-01-R: HCPCS

## 2021-10-11 NOTE — PROGRESS NOTES
Attempted PAT phone call; phone number is disconnected. Attempted to call emergency contact number; no answer; unable to leave a message. Voicemail is full. Notified Cynthia Queen at the office that we are unable to contact the patient after multiple attempts.

## 2021-10-11 NOTE — PROGRESS NOTES
Patient instructed on the pre-operative, intra-operative, and post-operative process. Patient instructed on NPO status. Medication instructions and Pre operative instruction sheet reviewed over the phone. CHG skin prep instructions reviewed with the patient. Instructed pt to stop taking ibuprofen 7 days prior to surgery and to take xanax and lexapro with a small sip of water prior to arriving to the hospital the day of surgery.

## 2021-10-12 ENCOUNTER — ANESTHESIA EVENT (OUTPATIENT)
Dept: OPERATING ROOM | Age: 31
End: 2021-10-12
Payer: MEDICAID

## 2021-10-12 LAB
SARS-COV-2: NORMAL
SARS-COV-2: NOT DETECTED
SOURCE: NORMAL

## 2021-10-13 ENCOUNTER — ANESTHESIA (OUTPATIENT)
Dept: OPERATING ROOM | Age: 31
End: 2021-10-13
Payer: MEDICAID

## 2021-10-13 ENCOUNTER — HOSPITAL ENCOUNTER (OUTPATIENT)
Age: 31
Setting detail: OUTPATIENT SURGERY
Discharge: HOME | End: 2021-10-13
Attending: SURGERY | Admitting: SURGERY
Payer: MEDICAID

## 2021-10-13 VITALS
HEART RATE: 53 BPM | DIASTOLIC BLOOD PRESSURE: 78 MMHG | WEIGHT: 187.8 LBS | BODY MASS INDEX: 33.27 KG/M2 | HEIGHT: 63 IN | TEMPERATURE: 97.3 F | OXYGEN SATURATION: 96 % | RESPIRATION RATE: 18 BRPM | SYSTOLIC BLOOD PRESSURE: 113 MMHG

## 2021-10-13 VITALS — SYSTOLIC BLOOD PRESSURE: 132 MMHG | DIASTOLIC BLOOD PRESSURE: 83 MMHG | OXYGEN SATURATION: 100 %

## 2021-10-13 VITALS — WEIGHT: 192 LBS | BODY MASS INDEX: 34.02 KG/M2 | HEIGHT: 63 IN

## 2021-10-13 DIAGNOSIS — Z90.49 S/P LAPAROSCOPIC CHOLECYSTECTOMY: Primary | ICD-10-CM

## 2021-10-13 PROBLEM — K80.10 CHOLECYSTITIS WITH CHOLELITHIASIS: Status: ACTIVE | Noted: 2021-10-13

## 2021-10-13 LAB — HCG(URINE) PREGNANCY TEST: NEGATIVE

## 2021-10-13 PROCEDURE — 3600000019 HC SURGERY ROBOT ADDTL 15MIN: Performed by: SURGERY

## 2021-10-13 PROCEDURE — 3700000000 HC ANESTHESIA ATTENDED CARE: Performed by: SURGERY

## 2021-10-13 PROCEDURE — 3600000009 HC SURGERY ROBOT BASE: Performed by: SURGERY

## 2021-10-13 PROCEDURE — 7100000001 HC PACU RECOVERY - ADDTL 15 MIN: Performed by: SURGERY

## 2021-10-13 PROCEDURE — 6360000002 HC RX W HCPCS: Performed by: NURSE ANESTHETIST, CERTIFIED REGISTERED

## 2021-10-13 PROCEDURE — 2500000003 HC RX 250 WO HCPCS: Performed by: NURSE ANESTHETIST, CERTIFIED REGISTERED

## 2021-10-13 PROCEDURE — 88304 TISSUE EXAM BY PATHOLOGIST: CPT

## 2021-10-13 PROCEDURE — 7100000000 HC PACU RECOVERY - FIRST 15 MIN: Performed by: SURGERY

## 2021-10-13 PROCEDURE — 2580000003 HC RX 258: Performed by: SURGERY

## 2021-10-13 PROCEDURE — 2580000003 HC RX 258: Performed by: NURSE ANESTHETIST, CERTIFIED REGISTERED

## 2021-10-13 PROCEDURE — 2709999900 HC NON-CHARGEABLE SUPPLY: Performed by: SURGERY

## 2021-10-13 PROCEDURE — 3700000001 HC ADD 15 MINUTES (ANESTHESIA): Performed by: SURGERY

## 2021-10-13 PROCEDURE — 2500000003 HC RX 250 WO HCPCS: Performed by: SURGERY

## 2021-10-13 PROCEDURE — 6370000000 HC RX 637 (ALT 250 FOR IP): Performed by: SURGERY

## 2021-10-13 PROCEDURE — 7100000011 HC PHASE II RECOVERY - ADDTL 15 MIN: Performed by: SURGERY

## 2021-10-13 PROCEDURE — 64488 TAP BLOCK BI INJECTION: CPT | Performed by: NURSE ANESTHETIST, CERTIFIED REGISTERED

## 2021-10-13 PROCEDURE — 6370000000 HC RX 637 (ALT 250 FOR IP): Performed by: NURSE ANESTHETIST, CERTIFIED REGISTERED

## 2021-10-13 PROCEDURE — 81025 URINE PREGNANCY TEST: CPT

## 2021-10-13 PROCEDURE — 7100000010 HC PHASE II RECOVERY - FIRST 15 MIN: Performed by: SURGERY

## 2021-10-13 PROCEDURE — S2900 ROBOTIC SURGICAL SYSTEM: HCPCS | Performed by: SURGERY

## 2021-10-13 RX ORDER — SODIUM CHLORIDE 9 MG/ML
25 INJECTION, SOLUTION INTRAVENOUS PRN
Status: CANCELLED | OUTPATIENT
Start: 2021-10-13

## 2021-10-13 RX ORDER — NEOSTIGMINE METHYLSULFATE 1 MG/ML
INJECTION, SOLUTION INTRAVENOUS PRN
Status: DISCONTINUED | OUTPATIENT
Start: 2021-10-13 | End: 2021-10-13 | Stop reason: SDUPTHER

## 2021-10-13 RX ORDER — SODIUM CHLORIDE 0.9 % (FLUSH) 0.9 %
5-40 SYRINGE (ML) INJECTION EVERY 12 HOURS SCHEDULED
Status: DISCONTINUED | OUTPATIENT
Start: 2021-10-13 | End: 2021-10-13 | Stop reason: HOSPADM

## 2021-10-13 RX ORDER — FENTANYL CITRATE 50 UG/ML
25 INJECTION, SOLUTION INTRAMUSCULAR; INTRAVENOUS EVERY 5 MIN PRN
Status: DISCONTINUED | OUTPATIENT
Start: 2021-10-13 | End: 2021-10-13 | Stop reason: HOSPADM

## 2021-10-13 RX ORDER — GABAPENTIN 100 MG/1
100 CAPSULE ORAL ONCE
Status: COMPLETED | OUTPATIENT
Start: 2021-10-13 | End: 2021-10-13

## 2021-10-13 RX ORDER — ROCURONIUM BROMIDE 10 MG/ML
INJECTION, SOLUTION INTRAVENOUS PRN
Status: DISCONTINUED | OUTPATIENT
Start: 2021-10-13 | End: 2021-10-13 | Stop reason: SDUPTHER

## 2021-10-13 RX ORDER — OXYCODONE HYDROCHLORIDE 5 MG/1
5 TABLET ORAL
Status: COMPLETED | OUTPATIENT
Start: 2021-10-13 | End: 2021-10-13

## 2021-10-13 RX ORDER — SODIUM CHLORIDE, SODIUM LACTATE, POTASSIUM CHLORIDE, CALCIUM CHLORIDE 600; 310; 30; 20 MG/100ML; MG/100ML; MG/100ML; MG/100ML
INJECTION, SOLUTION INTRAVENOUS CONTINUOUS
Status: CANCELLED | OUTPATIENT
Start: 2021-10-13

## 2021-10-13 RX ORDER — SODIUM CHLORIDE 0.9 % (FLUSH) 0.9 %
10 SYRINGE (ML) INJECTION PRN
Status: CANCELLED | OUTPATIENT
Start: 2021-10-13

## 2021-10-13 RX ORDER — MIDAZOLAM HYDROCHLORIDE 1 MG/ML
INJECTION INTRAMUSCULAR; INTRAVENOUS PRN
Status: DISCONTINUED | OUTPATIENT
Start: 2021-10-13 | End: 2021-10-13 | Stop reason: SDUPTHER

## 2021-10-13 RX ORDER — DEXAMETHASONE SODIUM PHOSPHATE 10 MG/ML
INJECTION, SOLUTION INTRAMUSCULAR; INTRAVENOUS PRN
Status: DISCONTINUED | OUTPATIENT
Start: 2021-10-13 | End: 2021-10-13 | Stop reason: SDUPTHER

## 2021-10-13 RX ORDER — ONDANSETRON 2 MG/ML
4 INJECTION INTRAMUSCULAR; INTRAVENOUS
Status: DISCONTINUED | OUTPATIENT
Start: 2021-10-13 | End: 2021-10-13 | Stop reason: HOSPADM

## 2021-10-13 RX ORDER — GINSENG 100 MG
CAPSULE ORAL PRN
Status: DISCONTINUED | OUTPATIENT
Start: 2021-10-13 | End: 2021-10-13 | Stop reason: ALTCHOICE

## 2021-10-13 RX ORDER — ROPIVACAINE HYDROCHLORIDE 5 MG/ML
INJECTION, SOLUTION EPIDURAL; INFILTRATION; PERINEURAL PRN
Status: DISCONTINUED | OUTPATIENT
Start: 2021-10-13 | End: 2021-10-13 | Stop reason: SDUPTHER

## 2021-10-13 RX ORDER — FENTANYL CITRATE 50 UG/ML
INJECTION, SOLUTION INTRAMUSCULAR; INTRAVENOUS PRN
Status: DISCONTINUED | OUTPATIENT
Start: 2021-10-13 | End: 2021-10-13 | Stop reason: SDUPTHER

## 2021-10-13 RX ORDER — SODIUM CHLORIDE 0.9 % (FLUSH) 0.9 %
10 SYRINGE (ML) INJECTION EVERY 12 HOURS SCHEDULED
Status: CANCELLED | OUTPATIENT
Start: 2021-10-13

## 2021-10-13 RX ORDER — SODIUM CHLORIDE, SODIUM LACTATE, POTASSIUM CHLORIDE, CALCIUM CHLORIDE 600; 310; 30; 20 MG/100ML; MG/100ML; MG/100ML; MG/100ML
INJECTION, SOLUTION INTRAVENOUS CONTINUOUS
Status: DISCONTINUED | OUTPATIENT
Start: 2021-10-13 | End: 2021-10-13 | Stop reason: HOSPADM

## 2021-10-13 RX ORDER — SODIUM CHLORIDE 9 MG/ML
INJECTION INTRAVENOUS PRN
Status: DISCONTINUED | OUTPATIENT
Start: 2021-10-13 | End: 2021-10-13 | Stop reason: SDUPTHER

## 2021-10-13 RX ORDER — SODIUM CHLORIDE 0.9 % (FLUSH) 0.9 %
5-40 SYRINGE (ML) INJECTION PRN
Status: DISCONTINUED | OUTPATIENT
Start: 2021-10-13 | End: 2021-10-13 | Stop reason: HOSPADM

## 2021-10-13 RX ORDER — METOCLOPRAMIDE HYDROCHLORIDE 5 MG/ML
10 INJECTION INTRAMUSCULAR; INTRAVENOUS
Status: DISCONTINUED | OUTPATIENT
Start: 2021-10-13 | End: 2021-10-13 | Stop reason: HOSPADM

## 2021-10-13 RX ORDER — LIDOCAINE HYDROCHLORIDE 20 MG/ML
INJECTION, SOLUTION EPIDURAL; INFILTRATION; INTRACAUDAL; PERINEURAL PRN
Status: DISCONTINUED | OUTPATIENT
Start: 2021-10-13 | End: 2021-10-13 | Stop reason: SDUPTHER

## 2021-10-13 RX ORDER — PROPOFOL 10 MG/ML
INJECTION, EMULSION INTRAVENOUS PRN
Status: DISCONTINUED | OUTPATIENT
Start: 2021-10-13 | End: 2021-10-13 | Stop reason: SDUPTHER

## 2021-10-13 RX ORDER — GLYCOPYRROLATE 1 MG/5 ML
SYRINGE (ML) INTRAVENOUS PRN
Status: DISCONTINUED | OUTPATIENT
Start: 2021-10-13 | End: 2021-10-13 | Stop reason: SDUPTHER

## 2021-10-13 RX ORDER — SODIUM CHLORIDE 9 MG/ML
25 INJECTION, SOLUTION INTRAVENOUS PRN
Status: DISCONTINUED | OUTPATIENT
Start: 2021-10-13 | End: 2021-10-13 | Stop reason: HOSPADM

## 2021-10-13 RX ORDER — ACETAMINOPHEN 325 MG/1
650 TABLET ORAL ONCE
Status: COMPLETED | OUTPATIENT
Start: 2021-10-13 | End: 2021-10-13

## 2021-10-13 RX ORDER — DIMENHYDRINATE 50 MG/1
50 TABLET ORAL ONCE
Status: COMPLETED | OUTPATIENT
Start: 2021-10-13 | End: 2021-10-13

## 2021-10-13 RX ORDER — FENTANYL CITRATE 50 UG/ML
50 INJECTION, SOLUTION INTRAMUSCULAR; INTRAVENOUS EVERY 5 MIN PRN
Status: DISCONTINUED | OUTPATIENT
Start: 2021-10-13 | End: 2021-10-13 | Stop reason: HOSPADM

## 2021-10-13 RX ORDER — INDOCYANINE GREEN AND WATER 25 MG
7.5 KIT INJECTION ONCE
Status: COMPLETED | OUTPATIENT
Start: 2021-10-13 | End: 2021-10-13

## 2021-10-13 RX ORDER — HYDROCODONE BITARTRATE AND ACETAMINOPHEN 5; 325 MG/1; MG/1
1 TABLET ORAL EVERY 6 HOURS PRN
Qty: 12 TABLET | Refills: 0 | Status: SHIPPED | OUTPATIENT
Start: 2021-10-13 | End: 2021-10-16

## 2021-10-13 RX ADMIN — ROCURONIUM BROMIDE 40 MG: 10 INJECTION, SOLUTION INTRAVENOUS at 09:04

## 2021-10-13 RX ADMIN — Medication 3 MG: at 10:06

## 2021-10-13 RX ADMIN — SODIUM CHLORIDE 50 ML: 9 INJECTION, SOLUTION INTRAMUSCULAR; INTRAVENOUS; SUBCUTANEOUS at 08:40

## 2021-10-13 RX ADMIN — FENTANYL CITRATE 100 MCG: 50 INJECTION, SOLUTION INTRAMUSCULAR; INTRAVENOUS at 08:42

## 2021-10-13 RX ADMIN — SODIUM CHLORIDE, POTASSIUM CHLORIDE, SODIUM LACTATE AND CALCIUM CHLORIDE: 600; 310; 30; 20 INJECTION, SOLUTION INTRAVENOUS at 08:30

## 2021-10-13 RX ADMIN — ROCURONIUM BROMIDE 10 MG: 10 INJECTION, SOLUTION INTRAVENOUS at 09:44

## 2021-10-13 RX ADMIN — Medication 0.6 MG: at 10:06

## 2021-10-13 RX ADMIN — FENTANYL CITRATE 25 MCG: 50 INJECTION, SOLUTION INTRAMUSCULAR; INTRAVENOUS at 10:10

## 2021-10-13 RX ADMIN — GABAPENTIN 100 MG: 100 CAPSULE ORAL at 06:50

## 2021-10-13 RX ADMIN — FENTANYL CITRATE 25 MCG: 50 INJECTION, SOLUTION INTRAMUSCULAR; INTRAVENOUS at 10:12

## 2021-10-13 RX ADMIN — MIDAZOLAM 2 MG: 1 INJECTION INTRAMUSCULAR; INTRAVENOUS at 08:42

## 2021-10-13 RX ADMIN — DEXAMETHASONE SODIUM PHOSPHATE 10 MG: 10 INJECTION, SOLUTION INTRAMUSCULAR; INTRAVENOUS at 08:40

## 2021-10-13 RX ADMIN — OXYCODONE HYDROCHLORIDE 5 MG: 5 TABLET ORAL at 11:49

## 2021-10-13 RX ADMIN — PROPOFOL 30 MG: 10 INJECTION, EMULSION INTRAVENOUS at 09:53

## 2021-10-13 RX ADMIN — SODIUM CHLORIDE, POTASSIUM CHLORIDE, SODIUM LACTATE AND CALCIUM CHLORIDE: 600; 310; 30; 20 INJECTION, SOLUTION INTRAVENOUS at 08:57

## 2021-10-13 RX ADMIN — FENTANYL CITRATE 50 MCG: 50 INJECTION INTRAMUSCULAR; INTRAVENOUS at 10:44

## 2021-10-13 RX ADMIN — LIDOCAINE HYDROCHLORIDE 100 MG: 20 INJECTION, SOLUTION EPIDURAL; INFILTRATION; INTRACAUDAL; PERINEURAL at 09:04

## 2021-10-13 RX ADMIN — ROPIVACAINE HYDROCHLORIDE 50 MG: 5 INJECTION, SOLUTION EPIDURAL; INFILTRATION; PERINEURAL at 08:40

## 2021-10-13 RX ADMIN — INDOCYANINE GREEN AND WATER 7.5 MG: KIT at 08:30

## 2021-10-13 RX ADMIN — PROPOFOL 150 MG: 10 INJECTION, EMULSION INTRAVENOUS at 09:04

## 2021-10-13 RX ADMIN — ROCURONIUM BROMIDE 10 MG: 10 INJECTION, SOLUTION INTRAVENOUS at 09:31

## 2021-10-13 RX ADMIN — ACETAMINOPHEN 650 MG: 325 TABLET ORAL at 06:50

## 2021-10-13 RX ADMIN — DIMENHYDRINATE 50 MG: 50 TABLET ORAL at 06:50

## 2021-10-13 ASSESSMENT — PAIN SCALES - GENERAL
PAINLEVEL_OUTOF10: 9
PAINLEVEL_OUTOF10: 10
PAINLEVEL_OUTOF10: 0
PAINLEVEL_OUTOF10: 10
PAINLEVEL_OUTOF10: 10

## 2021-10-13 ASSESSMENT — ENCOUNTER SYMPTOMS
CHOKING: 0
VOMITING: 0
ABDOMINAL DISTENTION: 1
BLOOD IN STOOL: 0
ABDOMINAL PAIN: 1
BACK PAIN: 0
COUGH: 0
TROUBLE SWALLOWING: 0
SORE THROAT: 0
SHORTNESS OF BREATH: 0
NAUSEA: 1

## 2021-10-13 ASSESSMENT — PAIN DESCRIPTION - PAIN TYPE
TYPE: SURGICAL PAIN
TYPE: SURGICAL PAIN

## 2021-10-13 ASSESSMENT — PAIN DESCRIPTION - LOCATION
LOCATION: ABDOMEN
LOCATION: ABDOMEN

## 2021-10-13 ASSESSMENT — PAIN - FUNCTIONAL ASSESSMENT: PAIN_FUNCTIONAL_ASSESSMENT: 0-10

## 2021-10-13 NOTE — OP NOTE
[address], [place] [address]                                OPERATIVE REPORT    PATIENT NAME: Sergey Welch                      :        1990  MED REC NO:   [id number]                              ROOM:  ACCOUNT NO:   [de-identified]                           ADMIT DATE: 10/13/2021  PROVIDER:     Aliza Postal    DATE OF PROCEDURE:  10/13/2021    ATTENDING SURGEON:  Dr. Aliza Enriquez. PRIMARY CARE PROVIDER:  Dee Gorman CNP    PREOPERATIVE DIAGNOSIS:  Cholecystitis with cholelithiasis. POSTOPERATIVE DIAGNOSIS:  Cholecystitis with cholelithiasis. PROCEDURE PERFORMED:  Laparoscopic cholecystectomy, robotic assist.    ANESTHESIA:  General endotracheal tube. IV FLUIDS:  One liter of crystalloids. ESTIMATED BLOOD LOSS:  Less than 20 mL. INTRAOPERATIVE FINDINGS:  As mentioned above. Cystic duct and artery  clearly visualized and confirmed with intraoperative indocyanine green  fluorescence. Good postoperative hemostasis. No evidence of bile leak. INDICATIONS:  The patient is a 25-year-old white female evaluated on  10/04 in [facility] with right upper quadrant abdominal pain. Workup  revealed cholecystitis with cholelithiasis. No evidence of biliary  obstruction. She has had a history of postprandial abdominal  discomfort. At this time, elective cholecystectomy is indicated. DESCRIPTION OF PROCEDURE:  After obtaining informed consent with  discussion of the risks, benefits, and alternatives including a risk of  bleeding, infection, bowel/bile duct injury, COVID-19  exposure/infection, etc., the patient was taken to the operating theater  and placed in the supine position on the operating table. She received  preoperative IV antibiotics and ALEKSANDR sequentials. Following smooth  induction of general anesthesia, she was positioned and prepped and  draped in the standard fashion.   An incision was carried down through  the skin and subcutaneous tissues just below the umbilicus. It was  deepened bluntly to the rectus fascia. Stay sutures of 0 Vicryl were  placed in the fascia. With anterior traction along the stay sutures, a  Veress needle was passed perpendicularly into the abdomen. Two clicks  were appreciated. Saline test showed rapid flow in the abdomen. A  pneumoperitoneum was then established to 15 mmHg. The Veress needle was  removed and a 12-mm balloon Visiport was placed under laparoscopic  vision directly into the abdomen. The rectus sheath and peritoneum were  clearly visualized during the course of their dissection. Upon entry  into the abdomen, all visible bowel, colon, and omentum were normal in  appearance with no evidence of needle nor trocar injury. Two 8-mm ports  were placed through the right and left lateral abdominal wall and a  third 8-mm port was placed in the right upper quadrant midclavicular  line, all under direct vision. The patient was placed in reverse  Trendelenburg, left side down. The [employer] surgical robot was  appropriately docked. The gallbladder was grasped and retracted  superiorly. The infundibulum was taken laterally. The cystic duct and  artery were dissected from the surrounding tissues. These two  structures and only these two structures were seen going directly to the  gallbladder. This was confirmed with intraoperative indocyanine green  fluorescence. The cystic duct and artery were clipped twice along the  patient's side, once along the gallbladder, and divided with cautery. The gallbladder was then removed from the undersurface of the liver with  selective use of cautery. Once freed, the gallbladder was retracted  away. Final inspection of the undersurface of the liver showed  excellent hemostasis. Clips were in place. No evidence of bile leak,  once again confirmed with intraoperative indocyanine green fluorescence.   The gallbladder was placed in an EndoCatch bag. The surgical robot was  undocked and retracted away. Each port was removed under direct vision  again assuring hemostasis. The gallbladder was removed through the  umbilical port site and passed off as specimen. The umbilical port site  was closed by reapproximation of the rectus fascia with 0 Vicryl in a  figure-of-eight fashion. Skin edges of each incision were approximated  with skin staples and then covered with bacitracin and sterile  dressings. All sponge, needle, and instrument counts were correct at  the end of the case. The patient tolerated the procedure well and was  transferred to PACU in stable condition. SPECIMENS:  Gallbladder. DRAINS:  None. COMPLICATIONS:  None. DISPOSITION:  To PACU awake, alert, and stable. Following recovery, we  will discharge the patient home with gradual advancement of diet and  activity as tolerated with instructions for no heavy lifting nor  abdominal straining for the next few weeks.   Followup will be with me in  one to two weeks for staple removal.        Letty Boucher    D: 10/13/2021 10:14:19       T: 10/13/2021 10:17:18     [email or web address]  Job#: [id number]     Doc#: [id number]    CC:  Genet Morris

## 2021-10-13 NOTE — H&P
HPI     Ms Bang Whitaker is a 17-year-old white female evaluated in [facility] ER October 4, 2021 for right upper quadrant abdominal pain. Discomfort has been present intermittently for 1 week with similar symptoms over the last several months. Nauseated without vomiting. No fevers no chills. Last menstrual period September 19. Gallbladder ultrasound October 6 showing gallstones with thickened gallbladder wall 5 mm consistent with cholecystitis, positive sonographic Escobar's. Hepatomegaly and fatty liver also noted. No evidence of biliary obstruction. WBC 7.4, H/H 13/38. AST 49, ALT 63, total bilirubin 0.1. She was treated and released, prescription for Zofran provided. No previous abdominal surgery. No cough, fever nor other respiratory symptoms. No history of COVID-19, no vaccination. Patient vapes daily. At this time, her pain has improved. Tolerating a bland diet. No new complaints.     Review of Systems   Constitutional: Negative for activity change, appetite change, chills, fever and unexpected weight change. HENT: Negative for nosebleeds, sneezing, sore throat and trouble swallowing. Eyes: Negative for visual disturbance. Respiratory: Negative for cough, choking and shortness of breath. Cardiovascular: Negative for chest pain, palpitations and leg swelling. Gastrointestinal: Positive for abdominal distention, abdominal pain and nausea. Negative for blood in stool and vomiting. Genitourinary: Negative for dysuria, flank pain and hematuria. Musculoskeletal: Negative for back pain, gait problem and myalgias. Allergic/Immunologic: Negative for immunocompromised state. Neurological: Negative for dizziness, seizures, syncope, weakness and headaches. Hematological: Does not bruise/bleed easily. Psychiatric/Behavioral: Negative for confusion and sleep disturbance.  The patient is nervous/anxious.          Past Medical History        Past Medical History:   Diagnosis Date    Anxiety         Past Surgical History         Past Surgical History:   Procedure Laterality Date    DENTAL SURGERY   march 5, 2016     tooth extraction 15 teeth    DENTAL SURGERY         duplicate             Family History         Family History   Problem Relation Age of Onset    Other Mother           kidney stones            Allergies:  See list     Current Facility-Administered Medications   No current facility-administered medications for this visit.      No current outpatient medications on file.                Facility-Administered Medications Ordered in Other Visits   Medication Dose Route Frequency Provider Last Rate Last Admin    lactated ringers infusion   IntraVENous Continuous Mimi Velasquez APRN - CRNA        indocyanine green (IC-GREEN) syringe 7.5 mg  7.5 mg IntraVENous Once Reyna Mera MD                Social History   Social History            Socioeconomic History    Marital status: Legally        Spouse name: Not on file    Number of children: Not on file    Years of education: Not on file    Highest education level: Not on file   Occupational History    Not on file   Tobacco Use    Smoking status: Current Every Day Smoker       Packs/day: 0.50       Years: 9.00       Pack years: 4.50       Types: Cigarettes    Smokeless tobacco: Never Used   Vaping Use    Vaping Use: Every day    Substances: Nicotine   Substance and Sexual Activity    Alcohol use: No    Drug use: No    Sexual activity: Not on file   Other Topics Concern    Not on file   Social History Narrative    Not on file      Social Determinants of Health          Financial Resource Strain:     Difficulty of Paying Living Expenses:    Food Insecurity:     Worried About Running Out of Food in the Last Year:     Ran Out of Food in the Last Year:    Transportation Needs:     Lack of Transportation (Medical):      Lack of Transportation (Non-Medical):    Physical Activity:     Days of Exercise per Week:     Minutes of Exercise per Session:    Stress:     Feeling of Stress :    Social Connections:     Frequency of Communication with Friends and Family:     Frequency of Social Gatherings with Friends and Family:     Attends [employer]oration[employer] Services:     Active Member of Clubs or Organizations:     Attends Club or Organization Meetings:     Marital Status:    Intimate Partner Violence:     Fear of Current or Ex-Partner:     Emotionally Abused:     Physically Abused:     Sexually Abused:             LMP 09/19/2021       Physical Exam  Vitals and nursing note reviewed. Constitutional:       Appearance: She is well-developed. HENT:      Head: Normocephalic and atraumatic. Eyes:      General: No scleral icterus. Conjunctiva/sclera: Conjunctivae normal.      Pupils: Pupils are equal, round, and reactive to light. Neck:      Vascular: No JVD. Trachea: No tracheal deviation. Cardiovascular:      Rate and Rhythm: Normal rate and regular rhythm. Pulmonary:      Effort: Pulmonary effort is normal. No respiratory distress. Chest:      Chest wall: No tenderness. Abdominal:      General: There is no distension. Palpations: Abdomen is soft. There is no mass. Tenderness: There is no abdominal tenderness. There is no guarding or rebound. Musculoskeletal:         General: Normal range of motion. Cervical back: Normal range of motion and neck supple. Lymphadenopathy:      Cervical: No cervical adenopathy. Skin:     General: Skin is warm and dry. Findings: No erythema or rash. Neurological:      Mental Status: She is alert and oriented to person, place, and time. Cranial Nerves: No cranial nerve deficit. Psychiatric:         Behavior: Behavior normal.         Thought Content:  Thought content normal.         Judgment: Judgment normal.            IMAGING/LABS     US GALLBLADDER RUQ  Status: Final result   Order Providers     Authorizing Encounter Billing   Aubrey Barahona MD [facility] ULTRASOUND ROOM DEMETRIUS Iam Trammell MD           Signed by     Signed Date/Time Phone Pager   Kostas Parra [facility] 10/06/2021 10:53 [phone]     Reading Providers     Read Date Phone Pager   Anjelica Us, Anika Vargas Oct 6, 2021 10:53 [phone]     Routing History     Priority Sent On From To Message Type     10/6/2021 10:55 AM MalcolmGoyo Incoming Radiant Results From [facility] [facility] Radiology F/U Results   Radiation Dose Estimates     No radiation information found for this patient   Narrative   EXAM: US GALLBLADDER RUQ        HISTORY: R811 58-year-old female, right upper quadrant abdominal pain.        COMPARISON: CT abdomen 8/8/27/2011           TECHNIQUE: Right upper quadrant ultrasound.           FINDINGS: Gallstones. Gallbladder wall thickening at 5 mm. The gallbladder is    somewhat contracted. Positive sonographic Escobar sign.       Common bile duct normal at 4 mm.       Likely hepatic steatosis. Liver is slightly enlarged.       The visualized portions of the pancreas are normal.       Right kidney: 10.4 x 4.4 x 4.7 cm with cortical thickness 13 mm.       Aorta nonaneurysmal. IVC normal.               Impression       Multiple gallstones. Positive sonographic Escobar sign.  No pericholecystic fluid.       Hepatic steatosis and slight hepatomegaly.      10/4/2021  9:57 AM - Goyo Fowler Incoming Lab Results From [facility]     Component Value Ref Range & Units Status Collected Lab   WBC 7.4  3.5 - 11.0 k/uL Final 10/04/2021  9:48 AM [facility] Lab   RBC 4.16  4.0 - 5.2 m/uL Final 10/04/2021  9:48 AM [facility] Lab   Hemoglobin 13.0  12.0 - 16.0 g/dL Final 10/04/2021  9:48 AM [facility] Lab   Hematocrit 38.2  36 - 46 % Final 10/04/2021  9:48 AM [facility] Lab   MCV 91.9  80 - 100 fL Final 10/04/2021  9:48 AM [address] Lab   MCH 31.2  26 - 34 pg Final 10/04/2021  9:48 AM [facility]   [facility] 34.0  31 - 37 g/dL Final 10/04/2021  9:48 AM [facility] Lab   RDW 13.7  12.1 - 15.2 % Final 10/04/2021  9:48 AM [facility] Lab   Platelets 845  507 - 450 k/uL Final 10/04/2021  9:48 AM [address] Lab   MPV NOT REPORTED  6.0 - 12.0 fL Final 10/04/2021  9:48 AM [facility] Lab   NRBC Automated NOT REPORTED  per 100 WBC Final 10/04/2021  9:48 AM [address] Lab   Differential Type YES    Final 10/04/2021  9:48 AM [facility] Lab   Seg Neutrophils 63  47 - 75 % Final 10/04/2021  9:48 AM [facility] Lab   Lymphocytes 28  15 - 40 % Final 10/04/2021  9:48 AM [address] Lab   Monocytes 7  4 - 8 % Final 10/04/2021  9:48 AM [facility]             ASSESSMENT       Diagnosis Orders   1. Calculus of gallbladder with acute cholecystitis without obstruction      2. BMI 34.0-34.9,adult      3. Hepatic steatosis      4. Hepatomegaly      5. Tobacco abuse            PLAN     Previously discussed at length with Ms. Edwardecka Camelia her recent ER evaluation with findings of cholecystitis and cholelithiasis. No significant interval changes since evaluation October 7, 2021. We will proceed with elective cholecystectomy, robotic assist.  Risks, benefits, alternatives thoroughly reviewed and accepted by Ms. Stone including bleeding, infection, bowel/bile duct injury, COVID-19 exposure/infection, etc.  Continue bland diet for now. Strongly encouraged complete tobacco cessation as well as COVID-19 vaccination.      Mellissa Mcdonald MD

## 2021-10-13 NOTE — PATIENT INSTRUCTIONS
Patient Education        Learning About Gallbladder Removal Surgery  What is it? This surgery removes the gallbladder and gallstones. The gallbladder stores bile made by your liver. The bile helps you digest fats. Gallstones are made of cholesterol and other things found in bile. The surgery is also known as cholecystectomy (br-oyg-fnk-SHERYL-tuh-jeannette). Your body will work fine without a gallbladder. Bile will go straight from the liver to the intestine. There may be small changes in how you digest food. But you probably won't notice them. How is the surgery done? This is usually a laparoscopic surgery. To do this type of surgery, a doctor puts a lighted tube, or scope, and other surgical tools through small cuts (incisions) in your belly. The doctor is able to see your organs with the scope. After your gallbladder is removed, you will no longer have gallstones. The cuts leave scars that usually fade with time. Open surgery may be done if problems are found during laparoscopic surgery. With open surgery, the gallbladder is removed through one larger cut in your belly. And the hospital stay is longer. What can you expect after surgery? You will probably feel weak and tired for several days after you return home. Your belly may be swollen. If you had laparoscopic surgery, you may also have pain in your shoulder for about 24 hours. You may have gas or need to burp a lot at first.  A few people get diarrhea. It usually goes away in 2 to 4 weeks. But it may last longer. How quickly you get better depends on which kind of surgery you had. For laparoscopic surgery, most people can go back to work or their normal routine in 1 to 2 weeks. It depends on the type of work you do and how you feel. If you have open surgery, it will probably take 4 to 6 weeks before you get back to your normal routine. Follow-up care is a key part of your treatment and safety.  Be sure to make and go to all appointments, and call your doctor if you are having problems. It's also a good idea to know your test results and keep a list of the medicines you take. Where can you learn more? Go to [email or web address]. org and sign in to your [employer] account. Enter M983 in the [facility] box to learn more about \"Learning About Gallbladder Removal Surgery. \"     If you do not have an account, please click on the \"Sign Up Now\" link. Current as of: February 10, 2021               Content Version: 13.0  © 0092-4616 Healthwise, Incorporated. Care instructions adapted under license by [facility] ([facility]). If you have questions about a medical condition or this instruction, always ask your healthcare professional. [address] any warranty or liability for your use of this information.

## 2021-10-13 NOTE — PROGRESS NOTES
Discharge instructions given to pt and visitors. Pt states ready to go home. Discharge Criteria    Inpatients must meet Criteria 1 through 7. All other patients are either YES or N/A. If a NO is chosen then Anesthesia or Surgeon must be notified. 1.  Minimum 30 minutes after last dose of sedative medication, minimum 120 minutes after last dose of reversal agent. Yes      2. Systolic BP stable within 20 mmHg for 30 minutes & systolic BP between 90 & 032 or within 10 mmHg of baseline. Yes      3. Pulse between 60 and 100 or within 10 bpm of baseline. Yes      4. Spontaneous respiratory rate >/= 10 per minute. Yes      5. SaO2 >/= 95 or  >/= baseline. Yes      6. Able to cough and swallow or return to baseline function. Yes      7. Alert and oriented or return to baseline mental status. Yes      8. Demonstrates controlled, coordinated movements, ambulates with steady gait, or return to baseline activity function. Yes      9. Minimal or no pain or nausea, or at a level tolerable and acceptable to patient. Yes      10. Takes and retains oral fluids as allowed. Yes      11. Procedural / perioperative site stable. Minimal or no bleeding. Yes          12. If GI endoscopy procedure, minimal or no abdominal distention or passing flatus. N/A      13. Written discharge instructions and emergency telephone number provided. Yes      14. Accompanied by a responsible adult.     Yes

## 2021-10-13 NOTE — BRIEF OP NOTE
Brief Postoperative Note      Patient: Iván Shabazz  YOB: 1990  MRN: [id number]    Date of Procedure: 10/13/2021    Pre-Op Diagnosis:     1. Cholecystitis with cholelithiasis    Post-Op Diagnosis:      1. Cholecystitis with cholelithiasis       Operation:     1. Lap mariia, robotic assist    Surgeon(s):  Alexi Headley MD    Assistant:  First Assistant: Kali Andersen RN    Anesthesia: General    Estimated Blood Loss (mL): less than 75ZR     Complications: None    Specimens:   ID Type Source Tests Collected by Time Destination   A :  Tissue Gallbladder SURGICAL PATHOLOGY Alexi Headley MD 10/13/2021 8634        Drains: None    Findings:  As above.     Dictated # [id number]    Electronically signed by Alexi Headley MD on 10/13/2021 at 10:08 AM

## 2021-10-13 NOTE — PROGRESS NOTES
Tutu Carmona MD  General Surgery, Endoscopy  Chief Medical Officer    [facility] Diya Gray  [address] [place] [id number]  Dept: [phone]  Fax: [phone] [address] [facility]  Chief Complaint   Patient presents with    New Patient     RUQ pain, [facility] ED 10/4, gallbladder US 10/6       HPI    Ms Sue Kan is a 25-year-old white female evaluated in [facility] ER October 4, 2021 for right upper quadrant abdominal pain. Discomfort has been present intermittently for 1 week with similar symptoms over the last several months. Nauseated without vomiting. No fevers no chills. Last menstrual period September 19. Gallbladder ultrasound October 6 showing gallstones with thickened gallbladder wall 5 mm consistent with cholecystitis, positive sonographic Escobar's. Hepatomegaly and fatty liver also noted. No evidence of biliary obstruction. WBC 7.4, H/H 13/38. AST 49, ALT 63, total bilirubin 0.1. She was treated and released, prescription for Zofran provided. No previous abdominal surgery. No cough, fever nor other respiratory symptoms. No history of COVID-19, no vaccination. Patient vapes daily. At this time, her pain has improved. Tolerating a bland diet. No new complaints. Review of Systems   Constitutional: Negative for activity change, appetite change, chills, fever and unexpected weight change. HENT: Negative for nosebleeds, sneezing, sore throat and trouble swallowing. Eyes: Negative for visual disturbance. Respiratory: Negative for cough, choking and shortness of breath. Cardiovascular: Negative for chest pain, palpitations and leg swelling. Gastrointestinal: Positive for abdominal distention, abdominal pain and nausea. Negative for blood in stool and vomiting. Genitourinary: Negative for dysuria, flank pain and hematuria. Musculoskeletal: Negative for back pain, gait problem and myalgias. Allergic/Immunologic: Negative for immunocompromised state. Neurological: Negative for dizziness, seizures, syncope, weakness and headaches. Hematological: Does not bruise/bleed easily. Psychiatric/Behavioral: Negative for confusion and sleep disturbance. The patient is nervous/anxious. Past Medical History:   Diagnosis Date    Anxiety        Past Surgical History:   Procedure Laterality Date    DENTAL SURGERY  march 5, 2016    tooth extraction 15 teeth    DENTAL SURGERY      duplicate        Family History   Problem Relation Age of Onset    Other Mother         kidney stones       Allergies:  See list    No current facility-administered medications for this visit. No current outpatient medications on file.      Facility-Administered Medications Ordered in Other Visits   Medication Dose Route Frequency Provider Last Rate Last Admin    lactated ringers infusion   IntraVENous Continuous Burlington Parcel, APRN - CRNA        indocyanine green (IC-GREEN) syringe 7.5 mg  7.5 mg IntraVENous Once Tutu Carmona MD           Social History     Socioeconomic History    Marital status: Legally      Spouse name: Not on file    Number of children: Not on file    Years of education: Not on file    Highest education level: Not on file   Occupational History    Not on file   Tobacco Use    Smoking status: Current Every Day Smoker     Packs/day: 0.50     Years: 9.00     Pack years: 4.50     Types: Cigarettes    Smokeless tobacco: Never Used   Vaping Use    Vaping Use: Every day    Substances: Nicotine   Substance and Sexual Activity    Alcohol use: No    Drug use: No    Sexual activity: Not on file   Other Topics Concern    Not on file   Social History Narrative    Not on file     Social Determinants of Health     Financial Resource Strain:     Difficulty of Paying Living Expenses:    Food Insecurity:     Worried About Running Out of Food in the Last Year:     [address] in the Last Year:    Transportation Needs:     Lack of Transportation (Medical):  Lack of Transportation (Non-Medical):    Physical Activity:     Days of Exercise per Week:     Minutes of Exercise per Session:    Stress:     Feeling of Stress :    Social Connections:     Frequency of Communication with Friends and Family:     Frequency of Social Gatherings with Friends and Family:     Attends [employer] Services:     Active Member of Clubs or Organizations:     Attends Club or Organization Meetings:     Marital Status:    Intimate Partner Violence:     Fear of Current or Ex-Partner:     Emotionally Abused:     Physically Abused:     Sexually Abused:        LMP 09/19/2021      Physical Exam  Vitals and nursing note reviewed. Constitutional:       Appearance: She is well-developed. HENT:      Head: Normocephalic and atraumatic. Eyes:      General: No scleral icterus. Conjunctiva/sclera: Conjunctivae normal.      Pupils: Pupils are equal, round, and reactive to light. Neck:      Vascular: No JVD. Trachea: No tracheal deviation. Cardiovascular:      Rate and Rhythm: Normal rate and regular rhythm. Pulmonary:      Effort: Pulmonary effort is normal. No respiratory distress. Chest:      Chest wall: No tenderness. Abdominal:      General: There is no distension. Palpations: Abdomen is soft. There is no mass. Tenderness: There is no abdominal tenderness. There is no guarding or rebound. Musculoskeletal:         General: Normal range of motion. Cervical back: Normal range of motion and neck supple. Lymphadenopathy:      Cervical: No cervical adenopathy. Skin:     General: Skin is warm and dry. Findings: No erythema or rash. Neurological:      Mental Status: She is alert and oriented to person, place, and time. Cranial Nerves: No cranial nerve deficit. Psychiatric:         Behavior: Behavior normal.         Thought Content:  Thought content normal.         Judgment: Judgment normal.  9:48 AM [address] Lab   MCH 31.2  26 - 34 pg Final 10/04/2021  9:48 AM [facility] Lab   MCHC 34.0  31 - 37 g/dL Final 10/04/2021  9:48 AM [facility] Lab   RDW 13.7  12.1 - 15.2 % Final 10/04/2021  9:48 AM [facility] Lab   Platelets 200  041 - 450 k/uL Final 10/04/2021  9:48 AM [address] Lab   MPV NOT REPORTED  6.0 - 12.0 fL Final 10/04/2021  9:48 AM [facility] Lab   [facility] Automated NOT REPORTED  per 100 WBC Final 10/04/2021  9:48 AM [address] Lab   Differential Type YES   Final 10/04/2021  9:48 AM [facility] Lab   Seg Neutrophils 63  47 - 75 % Final 10/04/2021  9:48 AM [facility] Lab   Lymphocytes 28  15 - 40 % Final 10/04/2021  9:48 AM [address] Lab   Monocytes 7  4 - 8 % Final 10/04/2021  9:48 AM [facility] Javan Mcmanus          ASSESSMENT     Diagnosis Orders   1. Calculus of gallbladder with acute cholecystitis without obstruction     2. BMI 34.0-34.9,adult     3. Hepatic steatosis     4. Hepatomegaly     5. Tobacco abuse         PLAN    Discussed at length with Ms. Harrellcindy Heaven her recent ER evaluation with findings of cholecystitis and cholelithiasis. We will proceed with elective cholecystectomy, robotic assist.  Risks, benefits, alternatives thoroughly reviewed and accepted by Ms. Stone including bleeding, infection, bowel/bile duct injury, COVID-19 exposure/infection, etc.  Continue bland diet for now. Strongly encouraged complete tobacco cessation as well as COVID-19 vaccination.      Aida García MD

## 2021-10-13 NOTE — ANESTHESIA PRE PROCEDURE
Department of Anesthesiology  Preprocedure Note       Name:  Alma Cleary   Age:  32 y.o.  :  1990                                          MRN:  [id number]         Date:  10/13/2021      Surgeon: Lawanda Sanderson):  Jose Alfredo Singh MD    Procedure: Procedure(s):  CHOLECYSTECTOMY LAPAROSCOPIC ROBOTIC    Medications prior to admission:   Prior to Admission medications    Medication Sig Start Date End Date Taking? Authorizing Provider   ALPRAZolam Daryel Jackson) 0.5 MG tablet Take 0.5 mg by mouth 4 times daily. Yes Historical Provider, MD   cloNIDine (CATAPRES) 0.1 MG tablet Take 0.1 mg by mouth nightly    Yes Historical Provider, MD   escitalopram (LEXAPRO) 5 MG tablet Take 5 mg by mouth daily   Yes Historical Provider, MD   metFORMIN (GLUCOPHAGE) 500 MG tablet Take 500 mg by mouth 2 times daily (with meals)   Yes Historical Provider, MD   ibuprofen (ADVIL;MOTRIN) 200 MG tablet Take 400 mg by mouth every 6 hours as needed for Pain   Yes Historical Provider, MD       Current medications:    Current Facility-Administered Medications   Medication Dose Route Frequency Provider Last Rate Last Admin    lactated ringers infusion   IntraVENous Continuous Carson Fortune, APRN - CRNA        lactated ringers infusion   IntraVENous Continuous Jose Alfredo Singh MD        sodium chloride flush 0.9 % injection 5-40 mL  5-40 mL IntraVENous 2 times per day Jose Alfredo Singh MD        sodium chloride flush 0.9 % injection 5-40 mL  5-40 mL IntraVENous PRN Jose Alfredo Singh MD        0.9 % sodium chloride infusion  25 mL IntraVENous PRN Jose Alfredo Singh MD        ceFAZolin (ANCEF) 2000 mg in dextrose 5 % 100 mL IVPB  2,000 mg IntraVENous On Call to [address] Edd Laureano MD        indocyanine green (IC-GREEN) syringe 7.5 mg  7.5 mg IntraVENous Once Jose Alfredo Singh MD           Allergies:     Allergies   Allergen Reactions    Buspar [Buspirone] Swelling    Codeine Other (See Comments)     Pt states it makes me feel sick    Penicillin G Problem List:    Patient Active Problem List   Diagnosis Code    Generalized anxiety disorder F41.1    Frequent urination R35.0    Vasovagal syncope R55       Past Medical History:        Diagnosis Date    Anxiety        Past Surgical History:        Procedure Laterality Date    DENTAL SURGERY  march 5, 2016    tooth extraction 15 teeth    DENTAL SURGERY      duplicate        Social History:    Social History     Tobacco Use    Smoking status: Current Every Day Smoker     Packs/day: 0.50     Years: 9.00     Pack years: 4.50     Types: Cigarettes    Smokeless tobacco: Never Used   Substance Use Topics    Alcohol use: No                                Ready to quit: Not Answered  Counseling given: Not Answered      Vital Signs (Current):   Vitals:    10/11/21 1153 10/13/21 0644   BP:  112/73   Pulse:  68   Resp:  16   Temp:  36.4 °C (97.5 °F)   TempSrc:  Temporal   SpO2:  98%   Weight: 190 lb (86.2 kg) 187 lb 12.8 oz (85.2 kg)   Height: 5' 3\" (1.6 m) 5' 3\" (1.6 m)                                              BP Readings from Last 3 Encounters:   10/13/21 112/73   10/04/21 108/74   12/02/18 122/72       NPO Status: Time of last liquid consumption: 0230 (sip of water with meds)                        Time of last solid consumption: 1630                        Date of last liquid consumption: 10/13/21                        Date of last solid food consumption: 10/12/21    BMI:   Wt Readings from Last 3 Encounters:   10/13/21 187 lb 12.8 oz (85.2 kg)   10/13/21 192 lb (87.1 kg)   10/04/21 190 lb (86.2 kg)     Body mass index is 33.27 kg/m².     CBC:   Lab Results   Component Value Date    WBC 7.4 10/04/2021    RBC 4.16 10/04/2021    RBC 4.32 08/27/2011    HGB 13.0 10/04/2021    HCT 38.2 10/04/2021    MCV 91.9 10/04/2021    RDW 13.7 10/04/2021     10/04/2021     08/27/2011       CMP:   Lab Results   Component Value Date     10/04/2021    K 4.2 10/04/2021     10/04/2021    CO2 23 10/04/2021    BUN 11 10/04/2021    CREATININE 0.63 10/04/2021    GFRAA >60 10/04/2021    LABGLOM >60 10/04/2021    GLUCOSE 93 10/04/2021    GLUCOSE 154 08/27/2011    PROT 7.0 10/04/2021    CALCIUM 9.3 10/04/2021    BILITOT 0.11 10/04/2021    ALKPHOS 95 10/04/2021    AST 49 10/04/2021    ALT 63 10/04/2021       POC Tests: No results for input(s): POCGLU, POCNA, POCK, POCCL, POCBUN, POCHEMO, POCHCT in the last 72 hours. Coags: No results found for: PROTIME, INR, APTT    HCG (If Applicable):   Lab Results   Component Value Date    PREGTESTUR NEGATIVE 10/13/2021    HCGQUANT <1 01/28/2015        ABGs: No results found for: PHART, PO2ART, XRA3RSY, FKJ5XPI, BEART, F9BPQIRH     Type & Screen (If Applicable):  No results found for: LABABO, LABRH    Drug/Infectious Status (If Applicable):  Lab Results   Component Value Date    HEPCAB REACTIVE 08/27/2011       COVID-19 Screening (If Applicable):   Lab Results   Component Value Date    COVID19 Not Detected 10/11/2021           Anesthesia Evaluation  Patient summary reviewed and Nursing notes reviewed  Airway: Mallampati: II  TM distance: >3 FB   Neck ROM: full  Mouth opening: > = 3 FB Dental:    (+) upper dentures      Pulmonary:Negative Pulmonary ROS and normal exam              Patient smoked on day of surgery. Cardiovascular:Negative CV ROS                      Neuro/Psych:   (+) psychiatric history: stable with treatment            GI/Hepatic/Renal: Neg GI/Hepatic/Renal ROS            Endo/Other: Negative Endo/Other ROS                    Abdominal:             Vascular: negative vascular ROS. Other Findings:             Anesthesia Plan      general     ASA 2       Induction: intravenous. Anesthetic plan and risks discussed with patient.                       IRINA Gannon - CRNA   10/13/2021

## 2021-10-13 NOTE — ANESTHESIA POSTPROCEDURE EVALUATION
Department of Anesthesiology  Postprocedure Note    Patient: Merline Flow  MRN: [id number]  YOB: 1990  Date of evaluation: 10/13/2021  Time:  12:54 PM     Procedure Summary     Date: 10/13/21 Room / Location: [address] [place] [address]    Anesthesia Start: 4106 Anesthesia Stop: 1565    Procedure: CHOLECYSTECTOMY LAPAROSCOPIC ROBOTIC (N/A ) Diagnosis: (CHOLETHIASIS)    Surgeons: Kala Wright MD Responsible Provider: IRINA Mcclendon CRNA    Anesthesia Type: general ASA Status: 2          Anesthesia Type: general    Sanjay Phase I: Sanjay Score: 10    Sanjay Phase II: Sanjay Score: 10    Last vitals: Reviewed and per EMR flowsheets. Anesthesia Post Evaluation    Patient location during evaluation: bedside  Patient participation: complete - patient participated  Level of consciousness: awake and alert  Pain score: 9  Airway patency: patent  Nausea & Vomiting: no nausea and no vomiting  Complications: no  Cardiovascular status: hemodynamically stable  Respiratory status: acceptable  Hydration status: stable  Comments: Patient reports pain 9/10 but is stating that it is tolerable for going home.

## 2021-10-13 NOTE — ANESTHESIA PROCEDURE NOTES
Peripheral Block    Patient location during procedure: pre-op  Start time: 10/13/2021 8:38 AM  End time: 10/13/2021 8:42 AM  Staffing  Resident/CRNA: Kyle Luna. IRINA Deleon CRNA  Peripheral Block  Patient position: supine  Prep: ChloraPrep  Patient monitoring: continuous pulse ox and IV access  Block type: TAP  Laterality: bilateral  Injection technique: single-shot  Guidance: ultrasound guided  Provider prep: mask  Needle  Needle type:  Other   Needle gauge: 22 G  Needle length: 8 cm  Needle localization: ultrasound guidance  Catheter type: closed endOther needle type: pajunk sonotap  Assessment  Injection assessment: negative aspiration for heme, no paresthesia on injection and local visualized surrounding nerve on ultrasound  Paresthesia pain: none  Slow fractionated injection: yes  Hemodynamics: stable  Additional Notes  50mL 0.5% ropivacaine, 50mL NaCl, 10mg decadron  Reason for block: post-op pain management and at surgeon's request

## 2021-10-15 LAB — SURGICAL PATHOLOGY REPORT: NORMAL

## 2021-10-21 ENCOUNTER — TELEPHONE (OUTPATIENT)
Dept: SURGERY | Age: 31
End: 2021-10-21

## 2021-10-21 NOTE — TELEPHONE ENCOUNTER
Attempted to contact patient to inform of missed post op appointment with Dr. Kush Vazquez today.  No answer unable to LVM

## 2021-10-25 ENCOUNTER — OFFICE VISIT (OUTPATIENT)
Dept: SURGERY | Age: 31
End: 2021-10-25

## 2021-10-25 DIAGNOSIS — Z90.49 S/P LAPAROSCOPIC CHOLECYSTECTOMY: Primary | ICD-10-CM

## 2021-10-25 DIAGNOSIS — Z72.0 TOBACCO ABUSE: ICD-10-CM

## 2021-10-25 PROCEDURE — 99024 POSTOP FOLLOW-UP VISIT: CPT | Performed by: SURGERY

## 2021-10-25 NOTE — LETTER
[address]  [address] [facility] [address]  Phone: [phone]  Fax: [phone]    Karla Sanderson MD        October 25, 2021     Patient: Shady [place]   YOB: 1990   Date of Visit: 10/25/2021       To Whom It May Concern: It is my medical opinion that Dahlia Singletary may return to work November 1, 2021, under light duty restrictions with no heavy lifting until November 15, 2021 where she can resume normal activity. If you have any questions or concerns, please don't hesitate to call.     Sincerely,        Karla Sanderson MD

## 2022-01-23 NOTE — PROGRESS NOTES
Jabari Sheridan MD  General Surgery, Endoscopy  Chief Medical Officer    [facility] Jaylen Mcconnell  [address] [facility] [address]  Dept: [phone]  Fax: [address]  Chief Complaint   Patient presents with    Post-Op Check     s/p cholecystectomy 10/13 POD 12       HPI    Ms. Margy De León returns for follow-up after cholecystectomy. She is doing well. No new complaints. Original pain prior to surgery has resolved. DATE OF PROCEDURE:  10/13/2021     ATTENDING SURGEON:  Dr. Molina Barreto.     PRIMARY CARE PROVIDER:  Nilson Damico CNP     PREOPERATIVE DIAGNOSIS:  Cholecystitis with cholelithiasis.     POSTOPERATIVE DIAGNOSIS:  Cholecystitis with cholelithiasis.     PROCEDURE PERFORMED:  Laparoscopic cholecystectomy, robotic assist.     INTRAOPERATIVE FINDINGS:  As mentioned above. Cystic duct and artery  clearly visualized and confirmed with intraoperative indocyanine green  fluorescence. Good postoperative hemostasis. No evidence of bile leak.     INDICATIONS:  The patient is a 55-year-old white female evaluated on  10/04 in [facility] ER with right upper quadrant abdominal pain. Workup  revealed cholecystitis with cholelithiasis. No evidence of biliary  obstruction. She has had a history of postprandial abdominal  discomfort. At this time, elective cholecystectomy is indicated.     Review of Systems    Past Medical History:   Diagnosis Date    Anxiety        Past Surgical History:   Procedure Laterality Date    CHOLECYSTECTOMY      CHOLECYSTECTOMY, LAPAROSCOPIC N/A 10/13/2021    CHOLECYSTECTOMY LAPAROSCOPIC ROBOTIC performed by Jabari Sheridan MD at [address] [place] [address]  march 5, 2016    tooth extraction 15 teeth    DENTAL SURGERY      duplicate        Family History   Problem Relation Age of Onset    Other Mother         kidney stones       Allergies:  See list    Current Outpatient Medications   Medication Sig Dispense Refill    ALPRAZolam (XANAX) 0.5 MG Club or Organization Meetings: Not on file    Marital Status: Not on file   Intimate Partner Violence:     Fear of Current or Ex-Partner: Not on file    Emotionally Abused: Not on file    Physically Abused: Not on file    Sexually Abused: Not on file   Housing Stability:     Unable to Pay for Housing in the Last Year: Not on file    Number of Ashley in the Last Year: Not on file    Unstable Housing in the Last Year: Not on file       There were no vitals taken for this visit. Physical Exam  Vitals and nursing note reviewed. Constitutional:       General: She is not in acute distress. Appearance: She is well-developed. HENT:      Head: Normocephalic and atraumatic. Eyes:      General: No scleral icterus. Conjunctiva/sclera: Conjunctivae normal.      Pupils: Pupils are equal, round, and reactive to light. Neck:      Trachea: No tracheal deviation. Cardiovascular:      Rate and Rhythm: Normal rate. Pulmonary:      Effort: Pulmonary effort is normal. No respiratory distress. Abdominal:      General: There is no distension. Palpations: Abdomen is soft. Tenderness: There is no abdominal tenderness. There is no guarding. Hernia: No hernia is present. Skin:     General: Skin is warm and dry. Neurological:      Mental Status: She is alert and oriented to person, place, and time. Psychiatric:         Behavior: Behavior normal.         Thought Content: Thought content normal.         Judgment: Judgment normal.         IMAGING/LABS    SURGICAL PATHOLOGY REPORT  Order: [id number]   Status: Final result     Visible to patient: No (not released)     Next appt: None     0 Result Notes    Component 10/13/21 0830   Surgical Pathology Report -- Diagnosis --   GALLBLADDER, CHOLECYSTECTOMY:   -CHRONIC CHOLECYSTITIS WITH CHOLELITHIASIS.        Nallely Lopez M.D.   **Electronically Signed Out**         duc/10/15/2021         Clinical Information   Pre-op Diagnosis:  CHOLELITHIASIS   Operative Findings:  [id number]   Operation Performed:  LAPAROSCOPIC CHOLECYSTECTOMY     Source of Specimen   1: GALLBLADDER     Gross Description   \"FRANCIS ZAHRA, GALLBLADDER\" 8.5 x 3.2 x 3.3 cm intact gallbladder with   a 1.5 cm long x 0.5 cm in diameter cystic duct.  The serosa is   purple-gray, and the liver bed is coarse tan-brown.  The wall is 0.1   cm in thickness, and the lumen contains approximately 20 cc of   tenacious green bile with a few green-brown calculi, 3.5 x 3.0 x 1.5   cm in aggregate.  The mucosa is dark green and velvety.  Cystic duct   margin and sections of gallbladder mucosa 1cs.  tm       Microscopic Description   Microscopic examination performed. SURGICAL PATHOLOGY CONSULTATION         Patient Name: Ally Rocks [facility] Rec: [id number]   Path Number: [id number]     [address] [place], [address]   [phone]   Fax: [phone]    [facility]              Specimen Collected: 10/13/21 08:30 Last Resulted: 10/15/21 12:06               ASSESSMENT     Diagnosis Orders   1. S/P laparoscopic cholecystectomy     2. Tobacco abuse     3. BMI 34.0-34.9,adult         PLAN    Ms. Daniel Shelley is doing well following robotic cholecystectomy. Continue gradual advancement of diet and activity as tolerated with no heavy lifting or abdominal straining for the next few weeks. Follow-up with me as needed.      Jenni Peabody, MD

## 2023-02-20 ENCOUNTER — HOSPITAL ENCOUNTER (OUTPATIENT)
Age: 33
Discharge: HOME | End: 2023-02-22
Payer: MEDICAID

## 2023-02-20 ENCOUNTER — HOSPITAL ENCOUNTER (OUTPATIENT)
Dept: GENERAL RADIOLOGY | Age: 33
Discharge: HOME | End: 2023-02-22
Payer: MEDICAID

## 2023-02-20 DIAGNOSIS — M54.30 SCIATICA, UNSPECIFIED LATERALITY: ICD-10-CM

## 2023-02-20 PROCEDURE — 72110 X-RAY EXAM L-2 SPINE 4/>VWS: CPT

## 2023-05-18 ENCOUNTER — APPOINTMENT (OUTPATIENT)
Dept: GENERAL RADIOLOGY | Age: 33
End: 2023-05-18
Payer: MEDICAID

## 2023-05-18 ENCOUNTER — HOSPITAL ENCOUNTER (EMERGENCY)
Age: 33
Discharge: HOME | End: 2023-05-18
Attending: EMERGENCY MEDICINE
Payer: MEDICAID

## 2023-05-18 VITALS
RESPIRATION RATE: 12 BRPM | DIASTOLIC BLOOD PRESSURE: 84 MMHG | WEIGHT: 188.3 LBS | SYSTOLIC BLOOD PRESSURE: 123 MMHG | TEMPERATURE: 98.3 F | OXYGEN SATURATION: 98 % | BODY MASS INDEX: 33.36 KG/M2 | HEART RATE: 89 BPM | HEIGHT: 63 IN

## 2023-05-18 DIAGNOSIS — R07.9 CHEST PAIN, UNSPECIFIED TYPE: ICD-10-CM

## 2023-05-18 DIAGNOSIS — F41.1 ANXIETY STATE: Primary | ICD-10-CM

## 2023-05-18 LAB — TROPONIN I SERPL HS-MCNC: <6 NG/L (ref 0–14)

## 2023-05-18 PROCEDURE — 6370000000 HC RX 637 (ALT 250 FOR IP): Performed by: EMERGENCY MEDICINE

## 2023-05-18 PROCEDURE — 36415 COLL VENOUS BLD VENIPUNCTURE: CPT

## 2023-05-18 PROCEDURE — 93005 ELECTROCARDIOGRAM TRACING: CPT | Performed by: EMERGENCY MEDICINE

## 2023-05-18 PROCEDURE — 71045 X-RAY EXAM CHEST 1 VIEW: CPT

## 2023-05-18 PROCEDURE — 84484 ASSAY OF TROPONIN QUANT: CPT

## 2023-05-18 PROCEDURE — 99285 EMERGENCY DEPT VISIT HI MDM: CPT

## 2023-05-18 RX ORDER — ALPRAZOLAM 0.25 MG/1
0.5 TABLET ORAL ONCE
Status: COMPLETED | OUTPATIENT
Start: 2023-05-18 | End: 2023-05-18

## 2023-05-18 RX ADMIN — ALPRAZOLAM 0.5 MG: 0.25 TABLET ORAL at 11:22

## 2023-05-18 ASSESSMENT — PAIN - FUNCTIONAL ASSESSMENT: PAIN_FUNCTIONAL_ASSESSMENT: 0-10

## 2023-05-18 ASSESSMENT — LIFESTYLE VARIABLES
HOW OFTEN DO YOU HAVE A DRINK CONTAINING ALCOHOL: NEVER
HOW MANY STANDARD DRINKS CONTAINING ALCOHOL DO YOU HAVE ON A TYPICAL DAY: PATIENT DOES NOT DRINK

## 2023-05-18 ASSESSMENT — PAIN DESCRIPTION - DESCRIPTORS: DESCRIPTORS: PRESSURE

## 2023-05-18 ASSESSMENT — PAIN DESCRIPTION - LOCATION: LOCATION: CHEST

## 2023-05-18 ASSESSMENT — PAIN SCALES - GENERAL: PAINLEVEL_OUTOF10: 8

## 2023-05-18 ASSESSMENT — PAIN DESCRIPTION - PAIN TYPE: TYPE: ACUTE PAIN

## 2023-05-18 NOTE — ED PROVIDER NOTES
rash.   Neurologic:  Alert & oriented x 3, Normal motor function, Normal sensory function, No focal deficits noted. Psychiatric: Anxious. EKG Interpretation    Interpreted by me    Rhythm: normal sinus   Rate: normal  Axis: normal  Ectopy: none  Conduction: normal  ST Segments: no acute change  T Waves: no acute change  Q Waves: none    Clinical Impression: no acute changes and normal EKG    Michael Lemon, DO  RADIOLOGY    XR CHEST 1 VIEW    (Results Pending)         Labs  Labs Reviewed   TROPONIN       Medical decision making    Patient with history of anxiety presents for evaluation of chest pain that she has had for the past 3 days. She took her last dose of Xanax 3 days ago and feels like she is in withdrawal despite having chest pain in the past with anxiety attacks. Physical exam is normal.  Vital signs stable. EKG normal.  Chest x-ray normal.  Troponin normal.  Patient given dose of Xanax 0.5 mg in the emergency room. I advised patient at this time I am not able to prescribe chronic anxiety medication out of the emergency room. Patient will be discharged home in stable condition to follow-up with her doctor as scheduled for reevaluation. Additional records reviewed including [facility] EMR. Labs, EKG and imaging were independently interpreted by myself as documented above. Agree with documented formal imaging reads by radiology. Admission/transfer was not considered in this case  Differential diagnosis considered anxiety, ACS, PE. Overall impression is discharged stable. Summation      Patient Course:     ED Medications administered this visit:    Medications   ALPRAZolam (XANAX) tablet 0.5 mg (has no administration in time range)       New Prescriptions from this visit:    New Prescriptions    No medications on file       Follow-up:  No follow-up provider specified. Final Impression:   1. Anxiety state Stable but not controlled   2.  Chest pain, unspecified type New Problem

## 2023-05-19 LAB
EKG ATRIAL RATE: 91 BPM
EKG P AXIS: 56 DEGREES
EKG P-R INTERVAL: 142 MS
EKG Q-T INTERVAL: 380 MS
EKG QRS DURATION: 82 MS
EKG QTC CALCULATION (BAZETT): 467 MS
EKG R AXIS: 6 DEGREES
EKG T AXIS: 27 DEGREES
EKG VENTRICULAR RATE: 91 BPM

## 2023-05-19 PROCEDURE — 93010 ELECTROCARDIOGRAM REPORT: CPT | Performed by: INTERNAL MEDICINE

## 2023-06-20 ENCOUNTER — HOSPITAL ENCOUNTER (OUTPATIENT)
Age: 33
Setting detail: SPECIMEN
Discharge: HOME | End: 2023-06-20
Payer: MEDICAID

## 2023-06-20 ENCOUNTER — TELEPHONE (OUTPATIENT)
Dept: FAMILY MEDICINE CLINIC | Age: 33
End: 2023-06-20

## 2023-06-20 ENCOUNTER — OFFICE VISIT (OUTPATIENT)
Dept: FAMILY MEDICINE CLINIC | Age: 33
End: 2023-06-20
Payer: MEDICAID

## 2023-06-20 VITALS
WEIGHT: 186 LBS | DIASTOLIC BLOOD PRESSURE: 86 MMHG | BODY MASS INDEX: 29.89 KG/M2 | SYSTOLIC BLOOD PRESSURE: 132 MMHG | HEART RATE: 90 BPM | OXYGEN SATURATION: 98 % | HEIGHT: 66 IN | TEMPERATURE: 98.2 F

## 2023-06-20 DIAGNOSIS — F43.10 PTSD (POST-TRAUMATIC STRESS DISORDER): ICD-10-CM

## 2023-06-20 DIAGNOSIS — Z13.1 ENCOUNTER FOR SCREENING EXAMINATION FOR IMPAIRED GLUCOSE REGULATION AND DIABETES MELLITUS: ICD-10-CM

## 2023-06-20 DIAGNOSIS — F41.1 GENERALIZED ANXIETY DISORDER: ICD-10-CM

## 2023-06-20 DIAGNOSIS — Z13.0 SCREENING FOR DEFICIENCY ANEMIA: ICD-10-CM

## 2023-06-20 DIAGNOSIS — F10.20 ALCOHOLISM (HCC): Primary | ICD-10-CM

## 2023-06-20 PROBLEM — R35.0 FREQUENT URINATION: Status: RESOLVED | Noted: 2017-03-22 | Resolved: 2023-06-20

## 2023-06-20 PROBLEM — R55 VASOVAGAL SYNCOPE: Status: RESOLVED | Noted: 2017-08-29 | Resolved: 2023-06-20

## 2023-06-20 PROBLEM — F41.9 ANXIETY: Status: RESOLVED | Noted: 2023-06-20 | Resolved: 2023-06-20

## 2023-06-20 PROBLEM — K80.10 CHOLECYSTITIS WITH CHOLELITHIASIS: Status: RESOLVED | Noted: 2021-10-13 | Resolved: 2023-06-20

## 2023-06-20 PROBLEM — F17.200 CURRENT SMOKER: Status: ACTIVE | Noted: 2020-09-22

## 2023-06-20 PROBLEM — F41.9 ANXIETY: Status: ACTIVE | Noted: 2023-06-20

## 2023-06-20 LAB
AMPHET UR QL SCN: NEGATIVE
BARBITURATES UR QL SCN: POSITIVE
BENZODIAZ UR QL: POSITIVE
CANNABINOID SCREEN URINE: POSITIVE
COCAINE UR QL SCN: NEGATIVE
FENTANYL URINE: NEGATIVE
METHADONE SCREEN, URINE: NEGATIVE
OPIATES UR QL SCN: NEGATIVE
OXYCODONE SCREEN URINE: NEGATIVE
PCP UR QL SCN: NEGATIVE
TEST INFORMATION: ABNORMAL

## 2023-06-20 PROCEDURE — G8427 DOCREV CUR MEDS BY ELIG CLIN: HCPCS | Performed by: NURSE PRACTITIONER

## 2023-06-20 PROCEDURE — 80307 DRUG TEST PRSMV CHEM ANLYZR: CPT

## 2023-06-20 PROCEDURE — G8417 CALC BMI ABV UP PARAM F/U: HCPCS | Performed by: NURSE PRACTITIONER

## 2023-06-20 PROCEDURE — 4004F PT TOBACCO SCREEN RCVD TLK: CPT | Performed by: NURSE PRACTITIONER

## 2023-06-20 PROCEDURE — 99203 OFFICE O/P NEW LOW 30 MIN: CPT | Performed by: NURSE PRACTITIONER

## 2023-06-20 RX ORDER — UREA 10 %
800 LOTION (ML) TOPICAL DAILY
COMMUNITY

## 2023-06-20 RX ORDER — ALBUTEROL SULFATE 90 UG/1
AEROSOL, METERED RESPIRATORY (INHALATION)
COMMUNITY
Start: 2023-04-14

## 2023-06-20 RX ORDER — CLONIDINE HYDROCHLORIDE 0.1 MG/1
0.1 TABLET ORAL NIGHTLY
Qty: 30 TABLET | Refills: 2 | Status: SHIPPED | OUTPATIENT
Start: 2023-06-20

## 2023-06-20 RX ORDER — ALPRAZOLAM 1 MG/1
1 TABLET ORAL 4 TIMES DAILY PRN
Qty: 120 TABLET | Refills: 2 | Status: SHIPPED | OUTPATIENT
Start: 2023-06-20 | End: 2023-09-18

## 2023-06-20 RX ORDER — ESCITALOPRAM OXALATE 10 MG/1
10 TABLET ORAL DAILY
Qty: 30 TABLET | Refills: 2 | Status: SHIPPED | OUTPATIENT
Start: 2023-06-20

## 2023-06-20 SDOH — ECONOMIC STABILITY: HOUSING INSECURITY
IN THE LAST 12 MONTHS, WAS THERE A TIME WHEN YOU DID NOT HAVE A STEADY PLACE TO SLEEP OR SLEPT IN A SHELTER (INCLUDING NOW)?: NO

## 2023-06-20 SDOH — ECONOMIC STABILITY: FOOD INSECURITY: WITHIN THE PAST 12 MONTHS, THE FOOD YOU BOUGHT JUST DIDN'T LAST AND YOU DIDN'T HAVE MONEY TO GET MORE.: NEVER TRUE

## 2023-06-20 SDOH — ECONOMIC STABILITY: FOOD INSECURITY: WITHIN THE PAST 12 MONTHS, YOU WORRIED THAT YOUR FOOD WOULD RUN OUT BEFORE YOU GOT MONEY TO BUY MORE.: NEVER TRUE

## 2023-06-20 SDOH — ECONOMIC STABILITY: INCOME INSECURITY: HOW HARD IS IT FOR YOU TO PAY FOR THE VERY BASICS LIKE FOOD, HOUSING, MEDICAL CARE, AND HEATING?: NOT HARD AT ALL

## 2023-06-20 ASSESSMENT — PATIENT HEALTH QUESTIONNAIRE - PHQ9
SUM OF ALL RESPONSES TO PHQ QUESTIONS 1-9: 2
1. LITTLE INTEREST OR PLEASURE IN DOING THINGS: 1
2. FEELING DOWN, DEPRESSED OR HOPELESS: 1
SUM OF ALL RESPONSES TO PHQ QUESTIONS 1-9: 2
SUM OF ALL RESPONSES TO PHQ9 QUESTIONS 1 & 2: 2

## 2023-06-20 ASSESSMENT — ENCOUNTER SYMPTOMS
SHORTNESS OF BREATH: 0
VOMITING: 0
NAUSEA: 0
SORE THROAT: 0
ABDOMINAL PAIN: 0
COUGH: 0
BLOOD IN STOOL: 0
CONSTIPATION: 0
DIARRHEA: 0
BACK PAIN: 0
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
## 2023-06-20 NOTE — PATIENT INSTRUCTIONS
SURVEY:    You may be receiving a survey from [employer] regarding your visit today. Please complete the survey to enable us to provide the highest quality of care to you and your family. If you cannot score us a very good (5 Stars) on any question, please call the office to discuss how we could have made your experience a very good one. Thank you.     Clinical Care Team: IRINA Ambrosio-DOROTA Olivier LPN    Clerical Team: Tommie Saldivar Pore

## 2023-06-20 NOTE — TELEPHONE ENCOUNTER
Pt called back with detail to vitamins she is currently taking:    Folic Acid 322 mcg- once daily  B-complex with C 300mg.- once daily  Collagen + vit c 6000mg once daily

## 2023-07-18 ENCOUNTER — HOSPITAL ENCOUNTER (OUTPATIENT)
Age: 33
Discharge: HOME | End: 2023-07-18

## 2023-07-18 ENCOUNTER — OFFICE VISIT (OUTPATIENT)
Dept: FAMILY MEDICINE CLINIC | Age: 33
End: 2023-07-18
Payer: MEDICAID

## 2023-07-18 VITALS
HEART RATE: 88 BPM | WEIGHT: 191 LBS | TEMPERATURE: 98.1 F | DIASTOLIC BLOOD PRESSURE: 60 MMHG | SYSTOLIC BLOOD PRESSURE: 118 MMHG | BODY MASS INDEX: 30.83 KG/M2

## 2023-07-18 DIAGNOSIS — F10.20 ALCOHOLISM (HCC): ICD-10-CM

## 2023-07-18 DIAGNOSIS — F41.1 GENERALIZED ANXIETY DISORDER: ICD-10-CM

## 2023-07-18 DIAGNOSIS — F43.10 PTSD (POST-TRAUMATIC STRESS DISORDER): ICD-10-CM

## 2023-07-18 DIAGNOSIS — F41.1 GENERALIZED ANXIETY DISORDER: Primary | ICD-10-CM

## 2023-07-18 DIAGNOSIS — Z13.0 SCREENING FOR DEFICIENCY ANEMIA: ICD-10-CM

## 2023-07-18 DIAGNOSIS — Z13.1 ENCOUNTER FOR SCREENING EXAMINATION FOR IMPAIRED GLUCOSE REGULATION AND DIABETES MELLITUS: ICD-10-CM

## 2023-07-18 PROCEDURE — G8427 DOCREV CUR MEDS BY ELIG CLIN: HCPCS | Performed by: NURSE PRACTITIONER

## 2023-07-18 PROCEDURE — 99213 OFFICE O/P EST LOW 20 MIN: CPT | Performed by: NURSE PRACTITIONER

## 2023-07-18 PROCEDURE — 4004F PT TOBACCO SCREEN RCVD TLK: CPT | Performed by: NURSE PRACTITIONER

## 2023-07-18 PROCEDURE — G8417 CALC BMI ABV UP PARAM F/U: HCPCS | Performed by: NURSE PRACTITIONER

## 2023-07-18 RX ORDER — ESCITALOPRAM OXALATE 10 MG/1
10 TABLET ORAL DAILY
Qty: 30 TABLET | Refills: 2 | Status: CANCELLED | OUTPATIENT
Start: 2023-07-18

## 2023-07-18 RX ORDER — ALPRAZOLAM 1 MG/1
1 TABLET ORAL 4 TIMES DAILY PRN
Qty: 120 TABLET | Refills: 2 | Status: CANCELLED | OUTPATIENT
Start: 2023-07-18 | End: 2023-10-16

## 2023-07-18 RX ORDER — CLONIDINE HYDROCHLORIDE 0.1 MG/1
0.1 TABLET ORAL NIGHTLY
Qty: 30 TABLET | Refills: 2 | Status: CANCELLED | OUTPATIENT
Start: 2023-07-18

## 2023-07-18 NOTE — PATIENT INSTRUCTIONS
SURVEY:    You may be receiving a survey from [employer] regarding your visit today. Please complete the survey to enable us to provide the highest quality of care to you and your family. If you cannot score us a very good (5 Stars) on any question, please call the office to discuss how we could have made your experience a very good one. Thank you.     Clinical Care Team: IRINA Leslie-DOROTA Zepeda LPN    Clerical Team: 1 Grayson Jenkins

## 2023-07-18 NOTE — PROGRESS NOTES
HPI Notes    Name: Monse Fuchs  : 1990         Chief Complaint:     Chief Complaint   Patient presents with    Mental Health Problem     Patient here today for follow up after increasing lexapro to 10 mg daily for her anxiety. She said she feels it is helping some. Alcohol Problem       History of Present Illness:        Mental Health Problem  The primary symptoms include dysphoric mood. The current episode started more than 1 month ago. This is a chronic problem. The onset of the illness is precipitated by emotional stress and alcohol abuse. The degree of incapacity that she is experiencing as a consequence of her illness is moderate. Additional symptoms of the illness do not include insomnia, fatigue or feelings of worthlessness. She does not admit to suicidal ideas. She does not have a plan to attempt suicide. She does not contemplate harming herself. She has not already injured self. She does not contemplate injuring another person. She has not already  injured another person. Risk factors that are present for mental illness include substance abuse. Alcohol Problem  The patient's primary symptoms include intoxication. This is a chronic (has been sober for over 2 months) problem.      Past Medical History:     Past Medical History:   Diagnosis Date    Anxiety     PTSD (post-traumatic stress disorder)       Reviewed all health maintenance requirements and ordered appropriate tests  Health Maintenance Due   Topic Date Due    COVID-19 Vaccine (1) Never done    Varicella vaccine (1 of 2 - 2-dose childhood series) Never done    Pneumococcal 0-64 years Vaccine (1 - PCV) Never done    Cervical cancer screen  Never done       Past Surgical History:     Past Surgical History:   Procedure Laterality Date    CHOLECYSTECTOMY      CHOLECYSTECTOMY, LAPAROSCOPIC N/A 10/13/2021    CHOLECYSTECTOMY LAPAROSCOPIC ROBOTIC performed by Geeta Brewer MD at [address] [place] [address] [address]  2016    tooth extraction

## 2023-07-21 ENCOUNTER — HOSPITAL ENCOUNTER (EMERGENCY)
Age: 33
Discharge: HOME | End: 2023-07-21
Attending: EMERGENCY MEDICINE
Payer: MEDICAID

## 2023-07-21 VITALS
RESPIRATION RATE: 18 BRPM | SYSTOLIC BLOOD PRESSURE: 112 MMHG | BODY MASS INDEX: 34.34 KG/M2 | HEIGHT: 63 IN | DIASTOLIC BLOOD PRESSURE: 86 MMHG | OXYGEN SATURATION: 97 % | HEART RATE: 86 BPM | TEMPERATURE: 98.4 F | WEIGHT: 193.8 LBS

## 2023-07-21 DIAGNOSIS — N93.8 DYSFUNCTIONAL UTERINE BLEEDING: Primary | ICD-10-CM

## 2023-07-21 LAB
BASOPHILS # BLD: 0 K/UL (ref 0–0.2)
BASOPHILS NFR BLD: 0 % (ref 0–2)
DIFFERENTIAL TYPE: YES
EOSINOPHIL # BLD: 0.3 K/UL (ref 0–0.4)
EOSINOPHILS RELATIVE PERCENT: 3 % (ref 0–5)
ERYTHROCYTE [DISTWIDTH] IN BLOOD BY AUTOMATED COUNT: 13.1 % (ref 12.1–15.2)
HCG SERPL QL: NEGATIVE
HCT VFR BLD AUTO: 35.2 % (ref 36–46)
HGB BLD-MCNC: 11.9 G/DL (ref 12–16)
LYMPHOCYTES NFR BLD: 2 K/UL (ref 1–4.8)
LYMPHOCYTES RELATIVE PERCENT: 22 % (ref 15–40)
MCH RBC QN AUTO: 31.3 PG (ref 26–34)
MCHC RBC AUTO-ENTMCNC: 33.7 G/DL (ref 31–37)
MCV RBC AUTO: 92.9 FL (ref 80–100)
MONOCYTES NFR BLD: 0.5 K/UL (ref 0–1)
MONOCYTES NFR BLD: 5 % (ref 4–8)
NEUTROPHILS NFR BLD: 70 % (ref 47–75)
NEUTS SEG NFR BLD: 6.4 K/UL (ref 2.5–7)
PLATELET # BLD AUTO: 325 K/UL (ref 140–450)
RBC # BLD AUTO: 3.79 M/UL (ref 4–5.2)
WBC OTHER # BLD: 9.3 K/UL (ref 3.5–11)

## 2023-07-21 PROCEDURE — 99283 EMERGENCY DEPT VISIT LOW MDM: CPT

## 2023-07-21 PROCEDURE — 85027 COMPLETE CBC AUTOMATED: CPT

## 2023-07-21 PROCEDURE — 84703 CHORIONIC GONADOTROPIN ASSAY: CPT

## 2023-07-21 RX ORDER — MEDROXYPROGESTERONE ACETATE 2.5 MG/1
10 TABLET ORAL DAILY
Qty: 28 TABLET | Refills: 0 | Status: SHIPPED | OUTPATIENT
Start: 2023-07-21 | End: 2023-07-28

## 2023-07-21 ASSESSMENT — PAIN DESCRIPTION - LOCATION: LOCATION: ABDOMEN

## 2023-07-21 ASSESSMENT — PAIN - FUNCTIONAL ASSESSMENT
PAIN_FUNCTIONAL_ASSESSMENT: ACTIVITIES ARE NOT PREVENTED
PAIN_FUNCTIONAL_ASSESSMENT: 0-10

## 2023-07-21 ASSESSMENT — PAIN DESCRIPTION - FREQUENCY: FREQUENCY: CONTINUOUS

## 2023-07-21 ASSESSMENT — PAIN DESCRIPTION - PAIN TYPE: TYPE: ACUTE PAIN

## 2023-07-21 ASSESSMENT — PAIN SCALES - GENERAL: PAINLEVEL_OUTOF10: 8

## 2023-07-21 ASSESSMENT — PAIN DESCRIPTION - DESCRIPTORS: DESCRIPTORS: SHARP

## 2023-07-21 ASSESSMENT — LIFESTYLE VARIABLES
HOW MANY STANDARD DRINKS CONTAINING ALCOHOL DO YOU HAVE ON A TYPICAL DAY: PATIENT DOES NOT DRINK
HOW OFTEN DO YOU HAVE A DRINK CONTAINING ALCOHOL: NEVER

## 2023-07-21 ASSESSMENT — PAIN DESCRIPTION - ORIENTATION: ORIENTATION: LOWER

## 2023-07-21 NOTE — DISCHARGE INSTRUCTIONS
Take estrogen and progesterone as prescribed. Return to ER for worsening symptoms including worsening vaginal bleeding or worsening abdominal pain or any other concerns. Follow-up with OB as scheduled. Tylenol and Motrin over-the-counter as needed for pain.

## 2023-08-16 DIAGNOSIS — F43.10 PTSD (POST-TRAUMATIC STRESS DISORDER): ICD-10-CM

## 2023-08-16 DIAGNOSIS — F41.1 GENERALIZED ANXIETY DISORDER: ICD-10-CM

## 2023-08-16 DIAGNOSIS — F10.20 ALCOHOLISM (HCC): ICD-10-CM

## 2023-08-16 RX ORDER — CLONIDINE HYDROCHLORIDE 0.1 MG/1
0.1 TABLET ORAL NIGHTLY
Qty: 30 TABLET | Refills: 2 | OUTPATIENT
Start: 2023-08-16

## 2023-08-16 RX ORDER — ALPRAZOLAM 1 MG/1
1 TABLET ORAL 4 TIMES DAILY PRN
Qty: 120 TABLET | Refills: 2 | Status: SHIPPED | OUTPATIENT
Start: 2023-08-16 | End: 2023-11-14

## 2023-08-16 RX ORDER — ESCITALOPRAM OXALATE 10 MG/1
10 TABLET ORAL DAILY
Qty: 30 TABLET | Refills: 2 | OUTPATIENT
Start: 2023-08-16

## 2023-08-16 RX ORDER — ALPRAZOLAM 1 MG/1
1 TABLET ORAL 4 TIMES DAILY PRN
Qty: 120 TABLET | Refills: 2 | OUTPATIENT
Start: 2023-08-16 | End: 2023-11-14

## 2023-08-16 RX ORDER — ESCITALOPRAM OXALATE 10 MG/1
10 TABLET ORAL DAILY
Qty: 30 TABLET | Refills: 2 | Status: SHIPPED | OUTPATIENT
Start: 2023-08-16

## 2023-08-16 RX ORDER — CLONIDINE HYDROCHLORIDE 0.1 MG/1
0.1 TABLET ORAL NIGHTLY
Qty: 30 TABLET | Refills: 2 | Status: SHIPPED | OUTPATIENT
Start: 2023-08-16

## 2023-08-16 NOTE — TELEPHONE ENCOUNTER
Last OV: 7/18/2023  chronic   Last RX:    Next scheduled apt: 8/16/2023 anxiety           Pt requesting a refill

## 2023-08-17 ENCOUNTER — HOSPITAL ENCOUNTER (EMERGENCY)
Age: 33
Discharge: HOME | End: 2023-08-17
Attending: EMERGENCY MEDICINE
Payer: MEDICAID

## 2023-08-17 VITALS
HEART RATE: 121 BPM | DIASTOLIC BLOOD PRESSURE: 101 MMHG | SYSTOLIC BLOOD PRESSURE: 134 MMHG | WEIGHT: 185 LBS | RESPIRATION RATE: 18 BRPM | OXYGEN SATURATION: 93 % | BODY MASS INDEX: 32.78 KG/M2 | TEMPERATURE: 99.2 F | HEIGHT: 63 IN

## 2023-08-17 DIAGNOSIS — S61.511A WRIST LACERATION, RIGHT, INITIAL ENCOUNTER: Primary | ICD-10-CM

## 2023-08-17 PROCEDURE — 2500000003 HC RX 250 WO HCPCS: Performed by: EMERGENCY MEDICINE

## 2023-08-17 PROCEDURE — 90715 TDAP VACCINE 7 YRS/> IM: CPT | Performed by: EMERGENCY MEDICINE

## 2023-08-17 PROCEDURE — 90471 IMMUNIZATION ADMIN: CPT | Performed by: EMERGENCY MEDICINE

## 2023-08-17 PROCEDURE — 99284 EMERGENCY DEPT VISIT MOD MDM: CPT

## 2023-08-17 PROCEDURE — 12002 RPR S/N/AX/GEN/TRNK2.6-7.5CM: CPT

## 2023-08-17 PROCEDURE — 6360000002 HC RX W HCPCS: Performed by: EMERGENCY MEDICINE

## 2023-08-17 RX ORDER — LIDOCAINE HYDROCHLORIDE AND EPINEPHRINE 10; 10 MG/ML; UG/ML
20 INJECTION, SOLUTION INFILTRATION; PERINEURAL ONCE
Status: COMPLETED | OUTPATIENT
Start: 2023-08-17 | End: 2023-08-17

## 2023-08-17 RX ADMIN — LIDOCAINE HYDROCHLORIDE,EPINEPHRINE BITARTRATE 20 ML: 10; .01 INJECTION, SOLUTION INFILTRATION; PERINEURAL at 14:03

## 2023-08-17 RX ADMIN — TETANUS TOXOID, REDUCED DIPHTHERIA TOXOID AND ACELLULAR PERTUSSIS VACCINE, ADSORBED 0.5 ML: 5; 2.5; 8; 8; 2.5 SUSPENSION INTRAMUSCULAR at 14:03

## 2023-08-17 ASSESSMENT — PAIN - FUNCTIONAL ASSESSMENT
PAIN_FUNCTIONAL_ASSESSMENT: 0-10
PAIN_FUNCTIONAL_ASSESSMENT: ACTIVITIES ARE NOT PREVENTED

## 2023-08-17 ASSESSMENT — PAIN SCALES - GENERAL: PAINLEVEL_OUTOF10: 10

## 2023-08-17 ASSESSMENT — ENCOUNTER SYMPTOMS
EYE DISCHARGE: 0
BACK PAIN: 0

## 2023-08-17 ASSESSMENT — PAIN DESCRIPTION - PAIN TYPE: TYPE: ACUTE PAIN

## 2023-08-17 ASSESSMENT — PAIN DESCRIPTION - ORIENTATION: ORIENTATION: RIGHT

## 2023-08-17 ASSESSMENT — PAIN DESCRIPTION - DESCRIPTORS: DESCRIPTORS: SHARP

## 2023-08-17 ASSESSMENT — PAIN DESCRIPTION - LOCATION: LOCATION: WRIST

## 2023-08-17 ASSESSMENT — PAIN DESCRIPTION - FREQUENCY: FREQUENCY: CONTINUOUS
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
## 2023-08-18 ENCOUNTER — HOSPITAL ENCOUNTER (OUTPATIENT)
Age: 33
End: 2023-08-18
Payer: MEDICAID

## 2023-08-18 ENCOUNTER — HOSPITAL ENCOUNTER (OUTPATIENT)
Dept: GENERAL RADIOLOGY | Age: 33
End: 2023-08-18
Payer: MEDICAID

## 2023-08-18 DIAGNOSIS — M25.531 RIGHT WRIST PAIN: ICD-10-CM

## 2023-08-18 PROCEDURE — 73110 X-RAY EXAM OF WRIST: CPT

## 2023-09-14 DIAGNOSIS — F41.1 GENERALIZED ANXIETY DISORDER: ICD-10-CM

## 2023-09-14 DIAGNOSIS — F43.10 PTSD (POST-TRAUMATIC STRESS DISORDER): ICD-10-CM

## 2023-09-14 DIAGNOSIS — F10.20 ALCOHOLISM (HCC): ICD-10-CM

## 2023-09-15 RX ORDER — ESCITALOPRAM OXALATE 10 MG/1
10 TABLET ORAL DAILY
Qty: 30 TABLET | Refills: 2 | OUTPATIENT
Start: 2023-09-15

## 2023-09-15 RX ORDER — ALPRAZOLAM 1 MG/1
1 TABLET ORAL 4 TIMES DAILY PRN
Qty: 120 TABLET | Refills: 2 | OUTPATIENT
Start: 2023-09-15 | End: 2023-12-14

## 2023-09-15 RX ORDER — CLONIDINE HYDROCHLORIDE 0.1 MG/1
0.1 TABLET ORAL NIGHTLY
Qty: 30 TABLET | Refills: 2 | OUTPATIENT
Start: 2023-09-15

## 2023-09-22 ENCOUNTER — TELEPHONE (OUTPATIENT)
Dept: FAMILY MEDICINE CLINIC | Age: 33
End: 2023-09-22

## 2023-09-22 RX ORDER — HYDROCHLOROTHIAZIDE 25 MG/1
25 TABLET ORAL EVERY MORNING
Qty: 90 TABLET | Refills: 1 | Status: SHIPPED | OUTPATIENT
Start: 2023-09-22

## 2023-09-22 RX ORDER — HYDROCHLOROTHIAZIDE 25 MG/1
25 TABLET ORAL EVERY MORNING
Qty: 90 TABLET | Refills: 1 | Status: SHIPPED | OUTPATIENT
Start: 2023-09-22 | End: 2023-09-22 | Stop reason: SDUPTHER

## 2023-09-22 NOTE — TELEPHONE ENCOUNTER
Kavya said her hands, legs and feet are swollen. She said this has been going on for about 3 days now. She would like to know if a water pill could be sent in for her without being seen. Please let Kavya know.     Dm-christi    Health Maintenance   Topic Date Due    Hepatitis B vaccine (1 of 3 - 3-dose series) Never done    COVID-19 Vaccine (1) Never done    Varicella vaccine (1 of 2 - 2-dose childhood series) Never done    Pneumococcal 0-64 years Vaccine (1 - PCV) Never done    Cervical cancer screen  Never done    Flu vaccine (1) 08/01/2023    Depression Screen  06/20/2024    DTaP/Tdap/Td vaccine (3 - Td or Tdap) 08/17/2033    Hepatitis C screen  Completed    HIV screen  Completed    Hepatitis A vaccine  Aged Out    Hib vaccine  Aged Out    HPV vaccine  Aged Out    Meningococcal (ACWY) vaccine  Aged Out             (applicable per patient's age: Cancer Screenings, Depression Screening, Fall Risk Screening, Immunizations)    Hemoglobin A1C (%)   Date Value   03/22/2017 5.0     AST (U/L)   Date Value   10/04/2021 49 (H)     ALT (U/L)   Date Value   10/04/2021 63 (H)     BUN (mg/dL)   Date Value   10/04/2021 11      (goal A1C is < 7)   (goal LDL is <100) need 30-50% reduction from baseline     BP Readings from Last 3 Encounters:   08/17/23 (!) 134/101   07/21/23 112/86   07/18/23 118/60    (goal /80)      All Future Testing planned in [facility]:  Lab Frequency Next Occurrence       Next Visit Date:  Future Appointments   Date Time Provider [address] [place] [address]   9/26/2023 12:40 PM [facility]            Patient Active Problem List:     Generalized anxiety disorder     Current smoker

## 2023-09-25 ENCOUNTER — PATIENT MESSAGE (OUTPATIENT)
Dept: FAMILY MEDICINE CLINIC | Age: 33
End: 2023-09-25

## 2023-09-26 NOTE — TELEPHONE ENCOUNTER
[facility]  To: Adwoa Eye  Sent: 9/25/2023 5:26 PM EDT  Subject: Upcoming appointment 9/26/2023    Good morning,   My niece is in labor in [place]. She is not doing so well. She is 21years old and weighs 80 pounds before her pregnancy. Now she is 135 and she is a month early! She is like my baby and I can not leave her! I'd never forgive myself if something happened to her while I was gone! Both my brother and her mother are addicts and are not here for her. She grabbed my hand and said \" Alejandro Carpio, please stay with me, I'm scared . \"How could I leave her now? My appointment with you is tomorrow and I am going to have to reschedule. I hate doing that I know you're a busy clinton! I hope you understand! And please pray for my niece Papo Claudio and her unborn babygirl Jory. I will call the office in the morning to get a new appointment. Thank you so much!   Kind regards,  Rafael Chilel   [phone]

## 2023-10-12 DIAGNOSIS — F41.1 GENERALIZED ANXIETY DISORDER: ICD-10-CM

## 2023-10-12 DIAGNOSIS — F10.20 ALCOHOLISM (HCC): ICD-10-CM

## 2023-10-12 DIAGNOSIS — F43.10 PTSD (POST-TRAUMATIC STRESS DISORDER): ICD-10-CM

## 2023-10-12 RX ORDER — ESCITALOPRAM OXALATE 10 MG/1
10 TABLET ORAL DAILY
Qty: 30 TABLET | Refills: 0 | Status: SHIPPED | OUTPATIENT
Start: 2023-10-12

## 2023-10-12 RX ORDER — ALPRAZOLAM 1 MG/1
1 TABLET ORAL 4 TIMES DAILY PRN
Qty: 120 TABLET | Refills: 0 | Status: SHIPPED | OUTPATIENT
Start: 2023-10-12 | End: 2024-01-10

## 2023-10-12 RX ORDER — ESCITALOPRAM OXALATE 10 MG/1
10 TABLET ORAL DAILY
Qty: 30 TABLET | Refills: 2 | OUTPATIENT
Start: 2023-10-12

## 2023-10-12 RX ORDER — CLONIDINE HYDROCHLORIDE 0.1 MG/1
0.1 TABLET ORAL NIGHTLY
Qty: 30 TABLET | Refills: 2 | OUTPATIENT
Start: 2023-10-12

## 2023-10-12 RX ORDER — CLONIDINE HYDROCHLORIDE 0.1 MG/1
0.1 TABLET ORAL NIGHTLY
Qty: 30 TABLET | Refills: 0 | Status: SHIPPED | OUTPATIENT
Start: 2023-10-12

## 2023-10-12 RX ORDER — ALPRAZOLAM 1 MG/1
1 TABLET ORAL 4 TIMES DAILY PRN
Qty: 120 TABLET | Refills: 2 | OUTPATIENT
Start: 2023-10-12 | End: 2024-01-10

## 2023-10-15 ENCOUNTER — APPOINTMENT (OUTPATIENT)
Dept: CT IMAGING | Age: 33
End: 2023-10-15
Payer: MEDICAID

## 2023-10-15 ENCOUNTER — HOSPITAL ENCOUNTER (EMERGENCY)
Age: 33
Discharge: HOME | End: 2023-10-15
Attending: EMERGENCY MEDICINE
Payer: MEDICAID

## 2023-10-15 VITALS
WEIGHT: 202 LBS | OXYGEN SATURATION: 99 % | HEIGHT: 62 IN | TEMPERATURE: 97.7 F | HEART RATE: 84 BPM | RESPIRATION RATE: 16 BRPM | BODY MASS INDEX: 37.17 KG/M2 | SYSTOLIC BLOOD PRESSURE: 99 MMHG | DIASTOLIC BLOOD PRESSURE: 62 MMHG

## 2023-10-15 DIAGNOSIS — F13.10 XANAX USE DISORDER, MILD, ABUSE (HCC): ICD-10-CM

## 2023-10-15 DIAGNOSIS — R55 SYNCOPE, UNSPECIFIED SYNCOPE TYPE: Primary | ICD-10-CM

## 2023-10-15 LAB
ALBUMIN SERPL-MCNC: 3.8 G/DL (ref 3.5–5.2)
ALP SERPL-CCNC: 90 U/L (ref 35–104)
ALT SERPL-CCNC: 68 U/L (ref 5–33)
AMPHET UR QL SCN: NEGATIVE
ANION GAP SERPL CALCULATED.3IONS-SCNC: 10 MMOL/L (ref 9–17)
AST SERPL-CCNC: 79 U/L
BARBITURATES UR QL SCN: NEGATIVE
BASOPHILS # BLD: 0.01 K/UL (ref 0–0.2)
BASOPHILS NFR BLD: 0 % (ref 0–2)
BENZODIAZ UR QL: POSITIVE
BILIRUB SERPL-MCNC: <0.1 MG/DL (ref 0.3–1.2)
BUN SERPL-MCNC: 9 MG/DL (ref 6–20)
BUN/CREAT SERPL: 11 (ref 9–20)
CALCIUM SERPL-MCNC: 9.1 MG/DL (ref 8.6–10.4)
CANNABINOIDS UR QL SCN: NEGATIVE
CHLORIDE SERPL-SCNC: 98 MMOL/L (ref 98–107)
CO2 SERPL-SCNC: 23 MMOL/L (ref 20–31)
COCAINE UR QL SCN: NEGATIVE
CREAT SERPL-MCNC: 0.8 MG/DL (ref 0.5–0.9)
EOSINOPHIL # BLD: 0.35 K/UL (ref 0–0.4)
EOSINOPHILS RELATIVE PERCENT: 5 % (ref 0–5)
ERYTHROCYTE [DISTWIDTH] IN BLOOD BY AUTOMATED COUNT: 12.3 % (ref 12.1–15.2)
ETHANOL PERCENT: <0.01 %
ETHANOLAMINE SERPL-MCNC: <10 MG/DL
FENTANYL UR QL: NEGATIVE
GFR SERPL CREATININE-BSD FRML MDRD: >60 ML/MIN/1.73M2
GLUCOSE SERPL-MCNC: 129 MG/DL (ref 70–99)
HCT VFR BLD AUTO: 33.7 % (ref 36–46)
HGB BLD-MCNC: 11.6 G/DL (ref 12–16)
IMM GRANULOCYTES # BLD AUTO: 0.02 K/UL (ref 0–0.3)
IMM GRANULOCYTES NFR BLD: 0 % (ref 0–5)
LYMPHOCYTES NFR BLD: 2.19 K/UL (ref 1–4.8)
LYMPHOCYTES RELATIVE PERCENT: 31 % (ref 15–40)
MCH RBC QN AUTO: 30.1 PG (ref 26–34)
MCHC RBC AUTO-ENTMCNC: 34.4 G/DL (ref 31–37)
MCV RBC AUTO: 87.3 FL (ref 80–100)
METHADONE UR QL: NEGATIVE
MONOCYTES NFR BLD: 0.38 K/UL (ref 0–1)
MONOCYTES NFR BLD: 5 % (ref 4–8)
NEUTROPHILS NFR BLD: 58 % (ref 47–75)
NEUTS SEG NFR BLD: 4.11 K/UL (ref 2.5–7)
OPIATES UR QL SCN: NEGATIVE
OXYCODONE UR QL SCN: NEGATIVE
PCP UR QL SCN: NEGATIVE
PLATELET # BLD AUTO: 283 K/UL (ref 140–450)
PMV BLD AUTO: 10.8 FL (ref 6–12)
POTASSIUM SERPL-SCNC: 3.8 MMOL/L (ref 3.7–5.3)
PROT SERPL-MCNC: 7.1 G/DL (ref 6.4–8.3)
RBC # BLD AUTO: 3.86 M/UL (ref 4–5.2)
SARS-COV-2 RDRP RESP QL NAA+PROBE: NOT DETECTED
SODIUM SERPL-SCNC: 131 MMOL/L (ref 135–144)
SPECIMEN DESCRIPTION: NORMAL
TEST INFORMATION: ABNORMAL
WBC OTHER # BLD: 7.1 K/UL (ref 3.5–11)

## 2023-10-15 PROCEDURE — 87635 SARS-COV-2 COVID-19 AMP PRB: CPT

## 2023-10-15 PROCEDURE — 72125 CT NECK SPINE W/O DYE: CPT

## 2023-10-15 PROCEDURE — 80053 COMPREHEN METABOLIC PANEL: CPT

## 2023-10-15 PROCEDURE — 70450 CT HEAD/BRAIN W/O DYE: CPT

## 2023-10-15 PROCEDURE — G0480 DRUG TEST DEF 1-7 CLASSES: HCPCS

## 2023-10-15 PROCEDURE — 99284 EMERGENCY DEPT VISIT MOD MDM: CPT

## 2023-10-15 PROCEDURE — C9803 HOPD COVID-19 SPEC COLLECT: HCPCS

## 2023-10-15 PROCEDURE — 85025 COMPLETE CBC W/AUTO DIFF WBC: CPT

## 2023-10-15 PROCEDURE — 71250 CT THORAX DX C-: CPT

## 2023-10-15 PROCEDURE — 80307 DRUG TEST PRSMV CHEM ANLYZR: CPT

## 2023-10-15 PROCEDURE — 93005 ELECTROCARDIOGRAM TRACING: CPT | Performed by: EMERGENCY MEDICINE

## 2023-10-15 PROCEDURE — 6360000002 HC RX W HCPCS: Performed by: EMERGENCY MEDICINE

## 2023-10-15 PROCEDURE — 96374 THER/PROPH/DIAG INJ IV PUSH: CPT

## 2023-10-15 RX ORDER — NALOXONE HYDROCHLORIDE 1 MG/ML
1 INJECTION INTRAMUSCULAR; INTRAVENOUS; SUBCUTANEOUS ONCE
Status: COMPLETED | OUTPATIENT
Start: 2023-10-15 | End: 2023-10-15

## 2023-10-15 RX ORDER — HYDROXYZINE HYDROCHLORIDE 25 MG/1
25 TABLET, FILM COATED ORAL 3 TIMES DAILY PRN
COMMUNITY
Start: 2023-08-16 | End: 2023-10-20 | Stop reason: SDUPTHER

## 2023-10-15 RX ADMIN — NALOXONE HYDROCHLORIDE 1 MG: 1 INJECTION PARENTERAL at 18:23

## 2023-10-15 ASSESSMENT — ENCOUNTER SYMPTOMS
COLOR CHANGE: 0
TROUBLE SWALLOWING: 0
SHORTNESS OF BREATH: 0
ABDOMINAL PAIN: 0
DIARRHEA: 0
EYE REDNESS: 0
VOMITING: 0
BACK PAIN: 0
EYE PAIN: 0
NAUSEA: 0
SORE THROAT: 0
COUGH: 0

## 2023-10-15 ASSESSMENT — LIFESTYLE VARIABLES
HOW OFTEN DO YOU HAVE A DRINK CONTAINING ALCOHOL: NEVER
HOW OFTEN DO YOU HAVE A DRINK CONTAINING ALCOHOL: NEVER
HOW MANY STANDARD DRINKS CONTAINING ALCOHOL DO YOU HAVE ON A TYPICAL DAY: PATIENT DOES NOT DRINK
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
## 2023-10-16 ENCOUNTER — PATIENT MESSAGE (OUTPATIENT)
Dept: FAMILY MEDICINE CLINIC | Age: 33
End: 2023-10-16

## 2023-10-16 ENCOUNTER — TELEPHONE (OUTPATIENT)
Dept: FAMILY MEDICINE CLINIC | Age: 33
End: 2023-10-16

## 2023-10-16 LAB
EKG ATRIAL RATE: 70 BPM
EKG P AXIS: 46 DEGREES
EKG P-R INTERVAL: 190 MS
EKG Q-T INTERVAL: 418 MS
EKG QRS DURATION: 84 MS
EKG QTC CALCULATION (BAZETT): 451 MS
EKG R AXIS: -5 DEGREES
EKG T AXIS: 3 DEGREES
EKG VENTRICULAR RATE: 70 BPM

## 2023-10-16 PROCEDURE — 93010 ELECTROCARDIOGRAM REPORT: CPT | Performed by: INTERNAL MEDICINE
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
## 2023-10-17 NOTE — TELEPHONE ENCOUNTER
Patient called in this morning to let [facility] know she found her prescription. She states the pills are wet but she found them.

## 2023-10-20 ENCOUNTER — OFFICE VISIT (OUTPATIENT)
Dept: FAMILY MEDICINE CLINIC | Age: 33
End: 2023-10-20
Payer: MEDICAID

## 2023-10-20 VITALS
DIASTOLIC BLOOD PRESSURE: 88 MMHG | TEMPERATURE: 98 F | HEART RATE: 58 BPM | OXYGEN SATURATION: 96 % | BODY MASS INDEX: 40.24 KG/M2 | SYSTOLIC BLOOD PRESSURE: 128 MMHG | WEIGHT: 220 LBS

## 2023-10-20 DIAGNOSIS — F41.1 GENERALIZED ANXIETY DISORDER: Primary | ICD-10-CM

## 2023-10-20 DIAGNOSIS — F43.10 PTSD (POST-TRAUMATIC STRESS DISORDER): ICD-10-CM

## 2023-10-20 DIAGNOSIS — F10.20 ALCOHOLISM (HCC): ICD-10-CM

## 2023-10-20 PROCEDURE — G8427 DOCREV CUR MEDS BY ELIG CLIN: HCPCS | Performed by: NURSE PRACTITIONER

## 2023-10-20 PROCEDURE — G8417 CALC BMI ABV UP PARAM F/U: HCPCS | Performed by: NURSE PRACTITIONER

## 2023-10-20 PROCEDURE — 4004F PT TOBACCO SCREEN RCVD TLK: CPT | Performed by: NURSE PRACTITIONER

## 2023-10-20 PROCEDURE — 99213 OFFICE O/P EST LOW 20 MIN: CPT | Performed by: NURSE PRACTITIONER

## 2023-10-20 PROCEDURE — G8484 FLU IMMUNIZE NO ADMIN: HCPCS | Performed by: NURSE PRACTITIONER

## 2023-10-20 RX ORDER — ESCITALOPRAM OXALATE 20 MG/1
20 TABLET ORAL DAILY
Qty: 30 TABLET | Refills: 2 | Status: SHIPPED | OUTPATIENT
Start: 2023-10-20

## 2023-10-20 RX ORDER — HYDROXYZINE 50 MG/1
50 TABLET, FILM COATED ORAL 3 TIMES DAILY PRN
Qty: 90 TABLET | Refills: 2 | Status: SHIPPED | OUTPATIENT
Start: 2023-10-20

## 2023-10-20 RX ORDER — CLONIDINE HYDROCHLORIDE 0.1 MG/1
0.1 TABLET ORAL NIGHTLY
Qty: 30 TABLET | Refills: 2 | Status: SHIPPED | OUTPATIENT
Start: 2023-10-20

## 2023-10-20 ASSESSMENT — ENCOUNTER SYMPTOMS
NAUSEA: 0
DIARRHEA: 0
SHORTNESS OF BREATH: 0
VOMITING: 0
COUGH: 0

## 2023-10-20 NOTE — PATIENT INSTRUCTIONS
SURVEY:    You may be receiving a survey from [facility] regarding your visit today. Please complete the survey to enable us to provide the highest quality of care to you and your family. If you cannot score us a very good (5 Stars) on any question, please call the office to discuss how we could have made your experience a very good one. Thank you.     Clinical Care Team: IRINA Rg-DOROTA Perea LPN    Clerical Team: [address] [facility]                           Vipin Ortiz
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
## 2023-11-09 DIAGNOSIS — F10.20 ALCOHOLISM (HCC): ICD-10-CM

## 2023-11-09 DIAGNOSIS — F43.10 PTSD (POST-TRAUMATIC STRESS DISORDER): ICD-10-CM

## 2023-11-09 DIAGNOSIS — F41.1 GENERALIZED ANXIETY DISORDER: ICD-10-CM

## 2023-11-09 RX ORDER — HYDROXYZINE 50 MG/1
50 TABLET, FILM COATED ORAL 3 TIMES DAILY PRN
Qty: 90 TABLET | Refills: 2 | Status: SHIPPED | OUTPATIENT
Start: 2023-11-09

## 2023-11-09 RX ORDER — ALPRAZOLAM 1 MG/1
1 TABLET ORAL 4 TIMES DAILY PRN
Qty: 120 TABLET | Refills: 0 | Status: SHIPPED | OUTPATIENT
Start: 2023-11-09 | End: 2024-02-07

## 2023-11-09 RX ORDER — CLONIDINE HYDROCHLORIDE 0.1 MG/1
0.1 TABLET ORAL NIGHTLY
Qty: 30 TABLET | Refills: 2 | Status: SHIPPED | OUTPATIENT
Start: 2023-11-09

## 2023-11-09 RX ORDER — ESCITALOPRAM OXALATE 20 MG/1
20 TABLET ORAL DAILY
Qty: 30 TABLET | Refills: 2 | Status: SHIPPED | OUTPATIENT
Start: 2023-11-09

## 2023-12-07 DIAGNOSIS — F41.1 GENERALIZED ANXIETY DISORDER: ICD-10-CM

## 2023-12-07 DIAGNOSIS — F10.20 ALCOHOLISM (HCC): ICD-10-CM

## 2023-12-07 DIAGNOSIS — F43.10 PTSD (POST-TRAUMATIC STRESS DISORDER): ICD-10-CM

## 2023-12-07 RX ORDER — HYDROXYZINE 50 MG/1
50 TABLET, FILM COATED ORAL 3 TIMES DAILY PRN
Qty: 90 TABLET | Refills: 2 | OUTPATIENT
Start: 2023-12-07

## 2023-12-07 RX ORDER — CLONIDINE HYDROCHLORIDE 0.1 MG/1
0.1 TABLET ORAL NIGHTLY
Qty: 30 TABLET | Refills: 2 | OUTPATIENT
Start: 2023-12-07

## 2023-12-07 RX ORDER — ESCITALOPRAM OXALATE 20 MG/1
20 TABLET ORAL DAILY
Qty: 30 TABLET | Refills: 2 | OUTPATIENT
Start: 2023-12-07

## 2023-12-07 RX ORDER — ALPRAZOLAM 1 MG/1
1 TABLET ORAL 4 TIMES DAILY PRN
Qty: 120 TABLET | Refills: 0 | Status: SHIPPED | OUTPATIENT
Start: 2023-12-07 | End: 2024-01-04 | Stop reason: SDUPTHER

## 2023-12-07 RX ORDER — ALPRAZOLAM 1 MG/1
1 TABLET ORAL 4 TIMES DAILY PRN
Qty: 120 TABLET | Refills: 0 | OUTPATIENT
Start: 2023-12-07 | End: 2024-03-06

## 2023-12-07 NOTE — TELEPHONE ENCOUNTER
Nov 7 I sent medications in with 2 refills. Just call the pharmacy and request a refill. MUST keep appt on 12/28

## 2023-12-07 NOTE — TELEPHONE ENCOUNTER
Last OV: 10/20/2023   mental health   Last RX:    Next scheduled apt: 12/28/2023  anxiety         Pt requesting a refill

## 2023-12-14 ENCOUNTER — PATIENT MESSAGE (OUTPATIENT)
Dept: FAMILY MEDICINE CLINIC | Age: 33
End: 2023-12-14

## 2023-12-14 ENCOUNTER — TELEPHONE (OUTPATIENT)
Dept: FAMILY MEDICINE CLINIC | Age: 33
End: 2023-12-14

## 2023-12-14 ENCOUNTER — HOSPITAL ENCOUNTER (EMERGENCY)
Age: 33
Discharge: ANOTHER HOSPITAL | End: 2023-12-15
Attending: EMERGENCY MEDICINE
Payer: MEDICAID

## 2023-12-14 ENCOUNTER — APPOINTMENT (OUTPATIENT)
Dept: CT IMAGING | Age: 33
End: 2023-12-14
Payer: MEDICAID

## 2023-12-14 DIAGNOSIS — K04.7 DENTAL ABSCESS: ICD-10-CM

## 2023-12-14 DIAGNOSIS — M27.2 MANDIBULAR ABSCESS: Primary | ICD-10-CM

## 2023-12-14 LAB
ANION GAP SERPL CALCULATED.3IONS-SCNC: 11 MMOL/L (ref 9–17)
BASOPHILS # BLD: 0.01 K/UL (ref 0–0.2)
BASOPHILS NFR BLD: 0 % (ref 0–2)
BUN SERPL-MCNC: 10 MG/DL (ref 6–20)
BUN/CREAT SERPL: 14 (ref 9–20)
CALCIUM SERPL-MCNC: 9.2 MG/DL (ref 8.6–10.4)
CHLORIDE SERPL-SCNC: 95 MMOL/L (ref 98–107)
CO2 SERPL-SCNC: 27 MMOL/L (ref 20–31)
CREAT SERPL-MCNC: 0.7 MG/DL (ref 0.5–0.9)
EOSINOPHIL # BLD: 0.15 K/UL (ref 0–0.4)
EOSINOPHILS RELATIVE PERCENT: 2 % (ref 0–5)
ERYTHROCYTE [DISTWIDTH] IN BLOOD BY AUTOMATED COUNT: 13 % (ref 12.1–15.2)
GFR SERPL CREATININE-BSD FRML MDRD: >60 ML/MIN/1.73M2
GLUCOSE SERPL-MCNC: 93 MG/DL (ref 70–99)
HCT VFR BLD AUTO: 35.2 % (ref 36–46)
HGB BLD-MCNC: 11.9 G/DL (ref 12–16)
IMM GRANULOCYTES # BLD AUTO: 0.04 K/UL (ref 0–0.3)
IMM GRANULOCYTES NFR BLD: 0 % (ref 0–5)
LACTATE BLDV-SCNC: 1 MMOL/L (ref 0.5–2.2)
LYMPHOCYTES NFR BLD: 1.73 K/UL (ref 1–4.8)
LYMPHOCYTES RELATIVE PERCENT: 18 % (ref 15–40)
MCH RBC QN AUTO: 29.1 PG (ref 26–34)
MCHC RBC AUTO-ENTMCNC: 33.8 G/DL (ref 31–37)
MCV RBC AUTO: 86.1 FL (ref 80–100)
MONOCYTES NFR BLD: 0.81 K/UL (ref 0–1)
MONOCYTES NFR BLD: 8 % (ref 4–8)
NEUTROPHILS NFR BLD: 72 % (ref 47–75)
NEUTS SEG NFR BLD: 6.85 K/UL (ref 2.5–7)
PLATELET # BLD AUTO: 345 K/UL (ref 140–450)
PMV BLD AUTO: 9.6 FL (ref 6–12)
POTASSIUM SERPL-SCNC: 3.7 MMOL/L (ref 3.7–5.3)
RBC # BLD AUTO: 4.09 M/UL (ref 4–5.2)
SODIUM SERPL-SCNC: 133 MMOL/L (ref 135–144)
WBC OTHER # BLD: 9.6 K/UL (ref 3.5–11)

## 2023-12-14 PROCEDURE — 96365 THER/PROPH/DIAG IV INF INIT: CPT

## 2023-12-14 PROCEDURE — 6360000002 HC RX W HCPCS: Performed by: EMERGENCY MEDICINE

## 2023-12-14 PROCEDURE — 96376 TX/PRO/DX INJ SAME DRUG ADON: CPT

## 2023-12-14 PROCEDURE — 2580000003 HC RX 258: Performed by: EMERGENCY MEDICINE

## 2023-12-14 PROCEDURE — 6360000004 HC RX CONTRAST MEDICATION: Performed by: EMERGENCY MEDICINE

## 2023-12-14 PROCEDURE — 85025 COMPLETE CBC W/AUTO DIFF WBC: CPT

## 2023-12-14 PROCEDURE — 96366 THER/PROPH/DIAG IV INF ADDON: CPT

## 2023-12-14 PROCEDURE — 99285 EMERGENCY DEPT VISIT HI MDM: CPT

## 2023-12-14 PROCEDURE — 96375 TX/PRO/DX INJ NEW DRUG ADDON: CPT

## 2023-12-14 PROCEDURE — 70491 CT SOFT TISSUE NECK W/DYE: CPT

## 2023-12-14 PROCEDURE — 80048 BASIC METABOLIC PNL TOTAL CA: CPT

## 2023-12-14 PROCEDURE — 83605 ASSAY OF LACTIC ACID: CPT

## 2023-12-14 PROCEDURE — 96367 TX/PROPH/DG ADDL SEQ IV INF: CPT

## 2023-12-14 RX ORDER — FENTANYL CITRATE 50 UG/ML
100 INJECTION, SOLUTION INTRAMUSCULAR; INTRAVENOUS ONCE
Status: COMPLETED | OUTPATIENT
Start: 2023-12-14 | End: 2023-12-14

## 2023-12-14 RX ORDER — LORAZEPAM 2 MG/ML
1 INJECTION INTRAMUSCULAR ONCE
Status: COMPLETED | OUTPATIENT
Start: 2023-12-14 | End: 2023-12-14

## 2023-12-14 RX ADMIN — METHYLPREDNISOLONE SODIUM SUCCINATE 40 MG: 40 INJECTION, POWDER, FOR SOLUTION INTRAMUSCULAR; INTRAVENOUS at 18:14

## 2023-12-14 RX ADMIN — FENTANYL CITRATE 100 MCG: 50 INJECTION, SOLUTION INTRAMUSCULAR; INTRAVENOUS at 18:18

## 2023-12-14 RX ADMIN — LORAZEPAM 1 MG: 2 INJECTION, SOLUTION INTRAMUSCULAR; INTRAVENOUS at 21:50

## 2023-12-14 RX ADMIN — IOPAMIDOL 75 ML: 755 INJECTION, SOLUTION INTRAVENOUS at 18:49

## 2023-12-14 RX ADMIN — CEFTRIAXONE SODIUM 1000 MG: 1 INJECTION, POWDER, FOR SOLUTION INTRAMUSCULAR; INTRAVENOUS at 18:26

## 2023-12-14 ASSESSMENT — PAIN SCALES - GENERAL
PAINLEVEL_OUTOF10: 8
PAINLEVEL_OUTOF10: 6
PAINLEVEL_OUTOF10: 4

## 2023-12-14 ASSESSMENT — PAIN DESCRIPTION - FREQUENCY: FREQUENCY: INTERMITTENT

## 2023-12-14 ASSESSMENT — PAIN DESCRIPTION - DESCRIPTORS
DESCRIPTORS: ACHING

## 2023-12-14 ASSESSMENT — PAIN DESCRIPTION - ORIENTATION
ORIENTATION: LEFT
ORIENTATION: LEFT

## 2023-12-14 ASSESSMENT — PAIN DESCRIPTION - LOCATION
LOCATION: TEETH
LOCATION: MOUTH
LOCATION: MOUTH

## 2023-12-14 ASSESSMENT — PAIN - FUNCTIONAL ASSESSMENT: PAIN_FUNCTIONAL_ASSESSMENT: 0-10

## 2023-12-14 ASSESSMENT — PAIN DESCRIPTION - PAIN TYPE: TYPE: ACUTE PAIN

## 2023-12-14 NOTE — TELEPHONE ENCOUNTER
From: Chris Ingram  To: Lyndon Doll  Sent: 12/14/2023 2:40 PM EST  Subject: Urgent     Good afternoon Marcelo Toribio,   I spoke to Eduardo, I have a I al surgery coming up in January. But something irritated that tooth. It is broke at the gum line. Yesterday I woke up with a swollen face, lip, and neck. Today is worse. I can not eat, drink, and barely talk. I am in so much pain. I've been taking ibuprofen 500mg every 4 hours. It doesn't help. Nor does orgel. I'm really dizzy and disoriented. Last night I thought my boyfriend was talking to me several times. I'm pretty scared.     See attached    Thank you

## 2023-12-14 NOTE — TELEPHONE ENCOUNTER
Patient complains of dental pain  Last OV 10/20/23 for anxiety, depression, alcoholism  She is going to oral surgeon on 1/25/23  Asking if you will call in antibiotic?   Allergy to buspar, codeine, dedrick  [facility]

## 2023-12-15 VITALS
BODY MASS INDEX: 38.62 KG/M2 | OXYGEN SATURATION: 93 % | TEMPERATURE: 97.9 F | DIASTOLIC BLOOD PRESSURE: 62 MMHG | SYSTOLIC BLOOD PRESSURE: 105 MMHG | WEIGHT: 218 LBS | HEIGHT: 63 IN | HEART RATE: 67 BPM | RESPIRATION RATE: 18 BRPM

## 2023-12-15 LAB
ABSOLUTE BANDS #: 0.94 K/UL (ref 0–1)
BANDS: 6 % (ref 0–10)
BASOPHILS # BLD: ABNORMAL K/UL (ref 0–0.2)
BASOPHILS NFR BLD: ABNORMAL % (ref 0–2)
EOSINOPHIL # BLD: 0.16 K/UL (ref 0–0.4)
EOSINOPHILS RELATIVE PERCENT: 1 % (ref 0–5)
ERYTHROCYTE [DISTWIDTH] IN BLOOD BY AUTOMATED COUNT: 12.7 % (ref 12.1–15.2)
HCG SERPL QL: NEGATIVE
HCT VFR BLD AUTO: 36.3 % (ref 36–46)
HGB BLD-MCNC: 12.4 G/DL (ref 12–16)
IMM GRANULOCYTES # BLD AUTO: ABNORMAL K/UL (ref 0–0.3)
IMM GRANULOCYTES NFR BLD: ABNORMAL %
LACTATE BLDV-SCNC: 0.9 MMOL/L (ref 0.5–2.2)
LYMPHOCYTES NFR BLD: 1.26 K/UL (ref 1–4.8)
LYMPHOCYTES RELATIVE PERCENT: 8 % (ref 15–40)
MCH RBC QN AUTO: 29 PG (ref 26–34)
MCHC RBC AUTO-ENTMCNC: 34.2 G/DL (ref 31–37)
MCV RBC AUTO: 84.8 FL (ref 80–100)
MONOCYTES NFR BLD: 0.31 K/UL (ref 0–1)
MONOCYTES NFR BLD: 2 % (ref 4–8)
MORPHOLOGY: ABNORMAL
NEUTROPHILS NFR BLD: 83 % (ref 47–75)
NEUTS SEG NFR BLD: 13.03 K/UL (ref 2.5–7)
PLATELET # BLD AUTO: 380 K/UL (ref 140–450)
PMV BLD AUTO: 9.6 FL (ref 6–12)
RBC # BLD AUTO: 4.28 M/UL (ref 4–5.2)
WBC OTHER # BLD: 15.7 K/UL (ref 3.5–11)

## 2023-12-15 PROCEDURE — 6360000002 HC RX W HCPCS: Performed by: EMERGENCY MEDICINE

## 2023-12-15 PROCEDURE — 6370000000 HC RX 637 (ALT 250 FOR IP): Performed by: EMERGENCY MEDICINE

## 2023-12-15 PROCEDURE — 6360000002 HC RX W HCPCS: Performed by: FAMILY MEDICINE

## 2023-12-15 PROCEDURE — 2580000003 HC RX 258: Performed by: FAMILY MEDICINE

## 2023-12-15 PROCEDURE — 2580000003 HC RX 258: Performed by: EMERGENCY MEDICINE

## 2023-12-15 PROCEDURE — 83605 ASSAY OF LACTIC ACID: CPT

## 2023-12-15 PROCEDURE — 85025 COMPLETE CBC W/AUTO DIFF WBC: CPT

## 2023-12-15 PROCEDURE — 84703 CHORIONIC GONADOTROPIN ASSAY: CPT

## 2023-12-15 PROCEDURE — 36415 COLL VENOUS BLD VENIPUNCTURE: CPT

## 2023-12-15 RX ORDER — 0.9 % SODIUM CHLORIDE 0.9 %
1000 INTRAVENOUS SOLUTION INTRAVENOUS ONCE
Status: COMPLETED | OUTPATIENT
Start: 2023-12-15 | End: 2023-12-15

## 2023-12-15 RX ORDER — CLONIDINE HYDROCHLORIDE 0.1 MG/1
0.1 TABLET ORAL ONCE
Status: DISCONTINUED | OUTPATIENT
Start: 2023-12-15 | End: 2023-12-15 | Stop reason: HOSPADM

## 2023-12-15 RX ORDER — FENTANYL CITRATE 50 UG/ML
100 INJECTION, SOLUTION INTRAMUSCULAR; INTRAVENOUS ONCE
Status: COMPLETED | OUTPATIENT
Start: 2023-12-15 | End: 2023-12-15

## 2023-12-15 RX ORDER — ALPRAZOLAM 0.25 MG/1
1 TABLET ORAL DAILY
Status: DISCONTINUED | OUTPATIENT
Start: 2023-12-15 | End: 2023-12-15 | Stop reason: HOSPADM

## 2023-12-15 RX ORDER — DEXAMETHASONE SODIUM PHOSPHATE 10 MG/ML
6 INJECTION, SOLUTION INTRAMUSCULAR; INTRAVENOUS ONCE
Status: COMPLETED | OUTPATIENT
Start: 2023-12-15 | End: 2023-12-15

## 2023-12-15 RX ORDER — HYDROCHLOROTHIAZIDE 25 MG/1
25 TABLET ORAL DAILY
Status: DISCONTINUED | OUTPATIENT
Start: 2023-12-15 | End: 2023-12-15 | Stop reason: HOSPADM

## 2023-12-15 RX ORDER — ESCITALOPRAM OXALATE 10 MG/1
20 TABLET ORAL DAILY
Status: DISCONTINUED | OUTPATIENT
Start: 2023-12-15 | End: 2023-12-15 | Stop reason: HOSPADM

## 2023-12-15 RX ORDER — KETOROLAC TROMETHAMINE 30 MG/ML
30 INJECTION, SOLUTION INTRAMUSCULAR; INTRAVENOUS ONCE
Status: COMPLETED | OUTPATIENT
Start: 2023-12-15 | End: 2023-12-15

## 2023-12-15 RX ORDER — FENTANYL CITRATE 50 UG/ML
50 INJECTION, SOLUTION INTRAMUSCULAR; INTRAVENOUS ONCE
Status: COMPLETED | OUTPATIENT
Start: 2023-12-15 | End: 2023-12-15

## 2023-12-15 RX ADMIN — ESCITALOPRAM OXALATE 20 MG: 10 TABLET ORAL at 07:42

## 2023-12-15 RX ADMIN — FENTANYL CITRATE 50 MCG: 50 INJECTION, SOLUTION INTRAMUSCULAR; INTRAVENOUS at 12:11

## 2023-12-15 RX ADMIN — PIPERACILLIN AND TAZOBACTAM 3375 MG: 3; .375 INJECTION, POWDER, LYOPHILIZED, FOR SOLUTION INTRAVENOUS at 02:17

## 2023-12-15 RX ADMIN — PIPERACILLIN AND TAZOBACTAM 3375 MG: 3; .375 INJECTION, POWDER, LYOPHILIZED, FOR SOLUTION INTRAVENOUS at 10:36

## 2023-12-15 RX ADMIN — DEXAMETHASONE SODIUM PHOSPHATE 6 MG: 10 INJECTION, SOLUTION INTRAMUSCULAR; INTRAVENOUS at 02:18

## 2023-12-15 RX ADMIN — FENTANYL CITRATE 100 MCG: 50 INJECTION, SOLUTION INTRAMUSCULAR; INTRAVENOUS at 01:37

## 2023-12-15 RX ADMIN — FENTANYL CITRATE 100 MCG: 50 INJECTION, SOLUTION INTRAMUSCULAR; INTRAVENOUS at 08:58

## 2023-12-15 RX ADMIN — HYDROCHLOROTHIAZIDE 25 MG: 25 TABLET ORAL at 07:36

## 2023-12-15 RX ADMIN — ALPRAZOLAM 1 MG: 0.25 TABLET ORAL at 07:36

## 2023-12-15 RX ADMIN — SODIUM CHLORIDE 1000 ML: 9 INJECTION, SOLUTION INTRAVENOUS at 01:32

## 2023-12-15 RX ADMIN — KETOROLAC TROMETHAMINE 30 MG: 30 INJECTION, SOLUTION INTRAMUSCULAR; INTRAVENOUS at 12:11

## 2023-12-15 ASSESSMENT — PAIN SCALES - GENERAL
PAINLEVEL_OUTOF10: 10
PAINLEVEL_OUTOF10: 4
PAINLEVEL_OUTOF10: 7
PAINLEVEL_OUTOF10: 9
PAINLEVEL_OUTOF10: 10

## 2023-12-15 ASSESSMENT — PAIN DESCRIPTION - FREQUENCY
FREQUENCY: INTERMITTENT
FREQUENCY: CONTINUOUS

## 2023-12-15 ASSESSMENT — PAIN DESCRIPTION - LOCATION
LOCATION: MOUTH
LOCATION: JAW;NECK
LOCATION: MOUTH

## 2023-12-15 ASSESSMENT — PAIN DESCRIPTION - DESCRIPTORS
DESCRIPTORS: THROBBING

## 2023-12-15 ASSESSMENT — PAIN DESCRIPTION - PAIN TYPE
TYPE: ACUTE PAIN
TYPE: ACUTE PAIN

## 2023-12-15 ASSESSMENT — PAIN DESCRIPTION - ORIENTATION
ORIENTATION: RIGHT
ORIENTATION: LEFT

## 2023-12-15 ASSESSMENT — PAIN - FUNCTIONAL ASSESSMENT: PAIN_FUNCTIONAL_ASSESSMENT: ACTIVITIES ARE NOT PREVENTED

## 2023-12-15 NOTE — ED NOTES
RN confirms with pt that her reaction to Pencillins is that she gets yeast infections prior to [employer], see eMAR. Educates pt that the benefits outweighs the risk for admin of antibiotic. Pt verbalizes understanding and accepts treatment.      Reji Penny, [address] [place] [address]  12/15/23 [id number]

## 2023-12-15 NOTE — ED NOTES
Patient stated she is feeling anxious at this time. Writer spoke with Dr. Isidro Melo. Orders placed.       Shanon Ramesh RN  12/14/23 [id number]

## 2023-12-15 NOTE — ED PROVIDER NOTES
Addendum note:    Received patient signout from Dr. Mariposa Rubi, ED physician, at 0800 hrs., guarding patient's presentation and current workup, patient with left mandibular and submandibular abscess seen on CTA, patient remains hemodynamically stable without airway compromise at this time, patient has received initially Solu-Medrol and ceftriaxone, later Decadron and Zosyn, patient has received her regular medications, pain control in the form of fentanyl. Patient is currently accepted to [facility] however she is waitlisted with estimated 40 on the wait list, no other facilities have responded. CT radiology report reviewed, lab workup reviewed, morning labs ordered including CBC with differential, lactic acid, serum hCG    Discussed with patient her CT findings, patient states she does feel better for as compared to when it first occurred last night, however still having some swelling, she did show a picture on her phone as when she was swollen yesterday. Discussed the difficulty getting oral surgery or OMFS in the hospital settings, but she is currently excepted over she will call them to find out what the status is, from previous experience we will try some of the [facility] though in the past had not received a call back or they did not have services available, patient states she was getting ready to see an oral surgeon next month though cannot member the name of the individual open [address] Given that she has formed an abscess, I would have some concern with patient being sent home on oral medications outpatient follow-up, will try to get hold of this oral surgeon up in [place] if possible, and will continue to work on getting patient placed at a hospital setting as noted above.    ~0840 hrs - Morning labs ordered    Lab work reviewed, WBC 15.7 with 83% PMNs and 6 bands, normal H&H and PLT, lactic 0.9, negative serum hCG.   Noted previous WBC 9.6 with 72% PMNs    ~1030 hrs -will continue with
TISSUE NECK W CONTRAST   Final Result      Periapical abscess involving the left mandibular second premolar tooth with    formation of a 0.5 x 1.9 cm left mandibular buccal subgingival abscess, a 0.8 x    0.8 cm left mandibular lingual subgingival abscess and left mandibular buccal    soft tissue edema and edema in adjacent subcutaneous soft tissue and in the    left submandibular space. PROCEDURES    Procedures    Labs  Labs Reviewed   CBC WITH AUTO DIFFERENTIAL - Abnormal; Notable for the following components:       Result Value    Hemoglobin 11.9 (*)     Hematocrit 35.2 (*)     All other components within normal limits   BASIC METABOLIC PANEL - Abnormal; Notable for the following components:    Sodium 133 (*)     Chloride 95 (*)     All other components within normal limits   LACTIC ACID       MIPS  Not applicable      Total Critical Care time was:    EMERGENCY DEPARTMENT COURSE and DIFFERENTIAL DIAGNOSIS/MDM:    Patient Course: Patient is 51-year-old female that presents to ED with significant left facial swelling left submandibular swelling. Patient has odontogenic left perimandibular abscess. Patient is given Rocephin 1 g IV and Solu-Medrol 40 mg IV. Airway remained patent. Patient states that she feels somewhat better. Patient was given 100 mcg of fentanyl for pain control. Send this is [facility] to accept patient due to inability to care for surgery. Discussed the patient with ER physician and ENT. [facility] discussed the patient nonviability for surgery  [address] center called. Patient is discussed unable to accept the patient due to long wait list in ED. [facility] patient is discussed with the access and the patient is excepted for transfer. No bed availability    Patient remained in ED for observation. Patient is given Zosyn 3,375 mg IV and Decadron 6 mg IV. Patient remained in stable condition.   Patient

## 2023-12-15 NOTE — ED NOTES
Patient placed on 2L via nasal cannula due to spo2 decreasing into high 80's while sleeping     Dipika Rao RN  12/14/23 [id number]

## 2023-12-29 ENCOUNTER — OFFICE VISIT (OUTPATIENT)
Dept: FAMILY MEDICINE CLINIC | Age: 33
End: 2023-12-29
Payer: MEDICAID

## 2023-12-29 VITALS
SYSTOLIC BLOOD PRESSURE: 110 MMHG | TEMPERATURE: 98.5 F | HEART RATE: 103 BPM | DIASTOLIC BLOOD PRESSURE: 72 MMHG | WEIGHT: 207.5 LBS | OXYGEN SATURATION: 97 % | BODY MASS INDEX: 36.76 KG/M2

## 2023-12-29 DIAGNOSIS — F41.1 GENERALIZED ANXIETY DISORDER: Primary | ICD-10-CM

## 2023-12-29 DIAGNOSIS — F43.10 PTSD (POST-TRAUMATIC STRESS DISORDER): ICD-10-CM

## 2023-12-29 PROCEDURE — 99213 OFFICE O/P EST LOW 20 MIN: CPT | Performed by: NURSE PRACTITIONER

## 2023-12-29 PROCEDURE — G8484 FLU IMMUNIZE NO ADMIN: HCPCS | Performed by: NURSE PRACTITIONER

## 2023-12-29 PROCEDURE — 4004F PT TOBACCO SCREEN RCVD TLK: CPT | Performed by: NURSE PRACTITIONER

## 2023-12-29 PROCEDURE — G8417 CALC BMI ABV UP PARAM F/U: HCPCS | Performed by: NURSE PRACTITIONER

## 2023-12-29 PROCEDURE — G8427 DOCREV CUR MEDS BY ELIG CLIN: HCPCS | Performed by: NURSE PRACTITIONER

## 2024-01-04 DIAGNOSIS — F10.20 ALCOHOLISM (HCC): ICD-10-CM

## 2024-01-04 DIAGNOSIS — F41.1 GENERALIZED ANXIETY DISORDER: ICD-10-CM

## 2024-01-04 DIAGNOSIS — F43.10 PTSD (POST-TRAUMATIC STRESS DISORDER): ICD-10-CM

## 2024-01-04 NOTE — TELEPHONE ENCOUNTER
Last OV 12/29/23 for anxiety and PTSD    Requesting refills on Escitalopram, Clonidine, Hydroxyzine, and Alprazolem thru [facility].  Rx's pending.    Next OV not scheduled

## 2024-01-05 RX ORDER — ALPRAZOLAM 1 MG/1
1 TABLET ORAL 4 TIMES DAILY PRN
Qty: 120 TABLET | Refills: 0 | Status: SHIPPED | OUTPATIENT
Start: 2024-01-05 | End: 2024-04-04

## 2024-01-05 RX ORDER — CLONIDINE HYDROCHLORIDE 0.1 MG/1
0.1 TABLET ORAL NIGHTLY
Qty: 30 TABLET | Refills: 2 | Status: SHIPPED | OUTPATIENT
Start: 2024-01-05

## 2024-01-05 RX ORDER — HYDROCHLOROTHIAZIDE 25 MG/1
25 TABLET ORAL EVERY MORNING
Qty: 90 TABLET | Refills: 1 | Status: SHIPPED | OUTPATIENT
Start: 2024-01-05

## 2024-01-05 RX ORDER — HYDROXYZINE 50 MG/1
50 TABLET, FILM COATED ORAL 3 TIMES DAILY PRN
Qty: 90 TABLET | Refills: 2 | Status: SHIPPED | OUTPATIENT
Start: 2024-01-05

## 2024-01-05 RX ORDER — ESCITALOPRAM OXALATE 20 MG/1
20 TABLET ORAL DAILY
Qty: 30 TABLET | Refills: 2 | Status: SHIPPED | OUTPATIENT
Start: 2024-01-05

## 2024-01-05 NOTE — TELEPHONE ENCOUNTER
Last OV 12/29/23 for Anxiety and PTSD    Requesting refill on Hydrochlorothiazide thru Mychart.  Rx pending    Next OV Not scheduled

## 2024-02-02 DIAGNOSIS — F41.1 GENERALIZED ANXIETY DISORDER: ICD-10-CM

## 2024-02-02 DIAGNOSIS — F43.10 PTSD (POST-TRAUMATIC STRESS DISORDER): ICD-10-CM

## 2024-02-02 DIAGNOSIS — F10.20 ALCOHOLISM (HCC): ICD-10-CM

## 2024-02-02 RX ORDER — ALPRAZOLAM 1 MG/1
1 TABLET ORAL 4 TIMES DAILY PRN
Qty: 120 TABLET | Refills: 0 | OUTPATIENT
Start: 2024-02-02 | End: 2024-05-02

## 2024-02-02 RX ORDER — ESCITALOPRAM OXALATE 20 MG/1
20 TABLET ORAL DAILY
Qty: 30 TABLET | Refills: 2 | OUTPATIENT
Start: 2024-02-02

## 2024-02-02 RX ORDER — CLONIDINE HYDROCHLORIDE 0.1 MG/1
0.1 TABLET ORAL NIGHTLY
Qty: 30 TABLET | Refills: 2 | OUTPATIENT
Start: 2024-02-02

## 2024-02-02 RX ORDER — HYDROXYZINE 50 MG/1
50 TABLET, FILM COATED ORAL 3 TIMES DAILY PRN
Qty: 90 TABLET | Refills: 2 | OUTPATIENT
Start: 2024-02-02

## 2024-03-01 DIAGNOSIS — F41.1 GENERALIZED ANXIETY DISORDER: ICD-10-CM

## 2024-03-01 DIAGNOSIS — F10.20 ALCOHOLISM (HCC): ICD-10-CM

## 2024-03-01 DIAGNOSIS — F43.10 PTSD (POST-TRAUMATIC STRESS DISORDER): ICD-10-CM

## 2024-03-01 RX ORDER — ALPRAZOLAM 1 MG/1
1 TABLET ORAL 4 TIMES DAILY PRN
Qty: 120 TABLET | Refills: 0 | Status: SHIPPED | OUTPATIENT
Start: 2024-03-01 | End: 2024-05-30

## 2024-03-01 RX ORDER — HYDROXYZINE 50 MG/1
50 TABLET, FILM COATED ORAL 3 TIMES DAILY PRN
Qty: 90 TABLET | Refills: 2 | Status: SHIPPED | OUTPATIENT
Start: 2024-03-01

## 2024-03-01 RX ORDER — ESCITALOPRAM OXALATE 20 MG/1
20 TABLET ORAL DAILY
Qty: 30 TABLET | Refills: 2 | Status: SHIPPED | OUTPATIENT
Start: 2024-03-01

## 2024-03-01 RX ORDER — CLONIDINE HYDROCHLORIDE 0.1 MG/1
0.1 TABLET ORAL NIGHTLY
Qty: 30 TABLET | Refills: 2 | Status: SHIPPED | OUTPATIENT
Start: 2024-03-01

## 2024-03-01 NOTE — TELEPHONE ENCOUNTER
Rx refill request via [employer]  Last OV 12/29/23 for f/u to anxiety  Currently does not have f/u

## 2024-03-25 ENCOUNTER — PATIENT MESSAGE (OUTPATIENT)
Dept: FAMILY MEDICINE CLINIC | Age: 34
End: 2024-03-25

## 2024-03-25 RX ORDER — ESCITALOPRAM OXALATE 20 MG/1
20 TABLET ORAL DAILY
Qty: 30 TABLET | Refills: 2 | Status: SHIPPED | OUTPATIENT
Start: 2024-03-25

## 2024-03-25 RX ORDER — HYDROCHLOROTHIAZIDE 25 MG/1
25 TABLET ORAL EVERY MORNING
Qty: 30 TABLET | Refills: 2 | Status: SHIPPED | OUTPATIENT
Start: 2024-03-25

## 2024-03-25 RX ORDER — HYDROXYZINE 50 MG/1
50 TABLET, FILM COATED ORAL 3 TIMES DAILY PRN
Qty: 90 TABLET | Refills: 2 | Status: SHIPPED | OUTPATIENT
Start: 2024-03-25

## 2024-03-25 RX ORDER — CLONIDINE HYDROCHLORIDE 0.1 MG/1
0.1 TABLET ORAL NIGHTLY
Qty: 30 TABLET | Refills: 2 | Status: SHIPPED | OUTPATIENT
Start: 2024-03-25
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
## 2024-03-28 DIAGNOSIS — F41.1 GENERALIZED ANXIETY DISORDER: ICD-10-CM

## 2024-03-28 DIAGNOSIS — F10.20 ALCOHOLISM (HCC): ICD-10-CM

## 2024-03-28 DIAGNOSIS — F43.10 PTSD (POST-TRAUMATIC STRESS DISORDER): ICD-10-CM

## 2024-03-28 RX ORDER — ALPRAZOLAM 1 MG/1
1 TABLET ORAL 4 TIMES DAILY PRN
Qty: 120 TABLET | Refills: 0 | Status: SHIPPED | OUTPATIENT
Start: 2024-03-28 | End: 2024-06-26

## 2024-03-28 NOTE — TELEPHONE ENCOUNTER
Last OV: 12/29/2023  mental Health   Last RX:    Next scheduled apt: Visit date not found            Pt requesting a refill

## 2024-05-31 ENCOUNTER — HOSPITAL ENCOUNTER (OUTPATIENT)
Age: 34
Discharge: HOME | End: 2024-05-31
Payer: MEDICAID

## 2024-05-31 LAB
ALBUMIN SERPL-MCNC: 3.6 G/DL (ref 3.5–5.2)
ALP SERPL-CCNC: 99 U/L (ref 35–104)
ALT SERPL-CCNC: 82 U/L (ref 5–33)
ANION GAP SERPL CALCULATED.3IONS-SCNC: 12 MMOL/L (ref 9–17)
AST SERPL-CCNC: 42 U/L
BILIRUB SERPL-MCNC: 0.1 MG/DL (ref 0.3–1.2)
BUN SERPL-MCNC: 7 MG/DL (ref 6–20)
BUN/CREAT SERPL: 9 (ref 9–20)
CALCIUM SERPL-MCNC: 9.5 MG/DL (ref 8.6–10.4)
CHLORIDE SERPL-SCNC: 98 MMOL/L (ref 98–107)
CO2 SERPL-SCNC: 24 MMOL/L (ref 20–31)
CREAT SERPL-MCNC: 0.8 MG/DL (ref 0.5–0.9)
ERYTHROCYTE [DISTWIDTH] IN BLOOD BY AUTOMATED COUNT: 14.2 % (ref 12.1–15.2)
GFR, ESTIMATED: >90 ML/MIN/1.73M2
GLUCOSE SERPL-MCNC: 117 MG/DL (ref 70–99)
HAV IGM SERPL QL IA: NONREACTIVE
HBV CORE IGM SERPL QL IA: NONREACTIVE
HBV SURFACE AG SERPL QL IA: NONREACTIVE
HCG SERPL QL: NEGATIVE
HCT VFR BLD AUTO: 35.8 % (ref 36–46)
HCV AB SERPL QL IA: REACTIVE
HGB BLD-MCNC: 11.9 G/DL (ref 12–16)
MCH RBC QN AUTO: 30.1 PG (ref 26–34)
MCHC RBC AUTO-ENTMCNC: 33.2 G/DL (ref 31–37)
MCV RBC AUTO: 90.6 FL (ref 80–100)
PLATELET # BLD AUTO: 275 K/UL (ref 140–450)
PMV BLD AUTO: 10 FL (ref 6–12)
POTASSIUM SERPL-SCNC: 3.8 MMOL/L (ref 3.7–5.3)
PROT SERPL-MCNC: 6.5 G/DL (ref 6.4–8.3)
RBC # BLD AUTO: 3.95 M/UL (ref 4–5.2)
SODIUM SERPL-SCNC: 134 MMOL/L (ref 135–144)
WBC OTHER # BLD: 6.5 K/UL (ref 3.5–11)

## 2024-05-31 PROCEDURE — 80053 COMPREHEN METABOLIC PANEL: CPT

## 2024-05-31 PROCEDURE — 85027 COMPLETE CBC AUTOMATED: CPT

## 2024-05-31 PROCEDURE — 84703 CHORIONIC GONADOTROPIN ASSAY: CPT

## 2024-05-31 PROCEDURE — 80074 ACUTE HEPATITIS PANEL: CPT

## 2024-05-31 PROCEDURE — 36415 COLL VENOUS BLD VENIPUNCTURE: CPT

## 2024-05-31 PROCEDURE — 87389 HIV-1 AG W/HIV-1&-2 AB AG IA: CPT

## 2024-06-01 LAB — HIV 1+2 AB+HIV1 P24 AG SERPL QL IA: NONREACTIVE

## 2024-10-29 RX ORDER — CLONIDINE HYDROCHLORIDE 0.1 MG/1
0.1 TABLET ORAL NIGHTLY
Qty: 30 TABLET | Refills: 2 | OUTPATIENT
Start: 2024-10-29

## 2025-02-25 ENCOUNTER — HOSPITAL ENCOUNTER (OUTPATIENT)
Age: 35
Setting detail: SPECIMEN
Discharge: HOME | End: 2025-02-25
Payer: MEDICAID

## 2025-02-25 ENCOUNTER — OFFICE VISIT (OUTPATIENT)
Dept: OBGYN CLINIC | Age: 35
End: 2025-02-25
Payer: MEDICAID

## 2025-02-25 VITALS
SYSTOLIC BLOOD PRESSURE: 122 MMHG | DIASTOLIC BLOOD PRESSURE: 76 MMHG | HEIGHT: 63 IN | BODY MASS INDEX: 40.04 KG/M2 | WEIGHT: 226 LBS

## 2025-02-25 DIAGNOSIS — N91.1 SECONDARY AMENORRHEA: Primary | ICD-10-CM

## 2025-02-25 DIAGNOSIS — Z01.419 ENCOUNTER FOR WELL WOMAN EXAM WITH ROUTINE GYNECOLOGICAL EXAM: ICD-10-CM

## 2025-02-25 PROCEDURE — G0145 SCR C/V CYTO,THINLAYER,RESCR: HCPCS

## 2025-02-25 PROCEDURE — 99385 PREV VISIT NEW AGE 18-39: CPT | Performed by: OBSTETRICS & GYNECOLOGY

## 2025-02-25 RX ORDER — BUPRENORPHINE HYDROCHLORIDE AND NALOXONE HYDROCHLORIDE DIHYDRATE 8; 2 MG/1; MG/1
TABLET SUBLINGUAL
COMMUNITY
Start: 2025-02-19

## 2025-02-25 SDOH — ECONOMIC STABILITY: FOOD INSECURITY: WITHIN THE PAST 12 MONTHS, YOU WORRIED THAT YOUR FOOD WOULD RUN OUT BEFORE YOU GOT MONEY TO BUY MORE.: NEVER TRUE

## 2025-02-25 SDOH — ECONOMIC STABILITY: FOOD INSECURITY: WITHIN THE PAST 12 MONTHS, THE FOOD YOU BOUGHT JUST DIDN'T LAST AND YOU DIDN'T HAVE MONEY TO GET MORE.: NEVER TRUE

## 2025-02-25 ASSESSMENT — PATIENT HEALTH QUESTIONNAIRE - PHQ9
2. FEELING DOWN, DEPRESSED OR HOPELESS: NOT AT ALL
SUM OF ALL RESPONSES TO PHQ9 QUESTIONS 1 & 2: 0
SUM OF ALL RESPONSES TO PHQ QUESTIONS 1-9: 0
SUM OF ALL RESPONSES TO PHQ QUESTIONS 1-9: 0
1. LITTLE INTEREST OR PLEASURE IN DOING THINGS: NOT AT ALL
SUM OF ALL RESPONSES TO PHQ QUESTIONS 1-9: 0
SUM OF ALL RESPONSES TO PHQ QUESTIONS 1-9: 0

## 2025-02-25 NOTE — PROGRESS NOTES
PROBLEM VISIT     Date of service: 2025    Kavya Stone  Is a 35 y.o. single female    PT's PCP is: Nile Murillo APRN - CNP     : 1990                                             Subjective:       No LMP recorded.   OB History    Para Term  AB Living   0             SAB IAB Ectopic Molar Multiple Live Births                      Social History     Tobacco Use   Smoking Status Every Day    Current packs/day: 0.50    Average packs/day: 0.5 packs/day for 9.0 years (4.5 ttl pk-yrs)    Types: Cigarettes    Passive exposure: Current   Smokeless Tobacco Never        Social History     Substance and Sexual Activity   Alcohol Use Not Currently       Allergies: Buspar [buspirone], Codeine, and Penicillin g      Current Outpatient Medications:     cloNIDine (CATAPRES) 0.1 MG tablet, Take 1 tablet by mouth nightly, Disp: 30 tablet, Rfl: 2    escitalopram (LEXAPRO) 20 MG tablet, Take 1 tablet by mouth daily, Disp: 30 tablet, Rfl: 2    hydroCHLOROthiazide (HYDRODIURIL) 25 MG tablet, Take 1 tablet by mouth every morning, Disp: 30 tablet, Rfl: 2    hydrOXYzine HCl (ATARAX) 50 MG tablet, Take 1 tablet by mouth 3 times daily as needed for Anxiety, Disp: 90 tablet, Rfl: 2    escitalopram (LEXAPRO) 20 MG tablet, Take 1 tablet by mouth daily, Disp: 30 tablet, Rfl: 2    cloNIDine (CATAPRES) 0.1 MG tablet, Take 1 tablet by mouth nightly, Disp: 30 tablet, Rfl: 2    hydrOXYzine HCl (ATARAX) 50 MG tablet, Take 1 tablet by mouth 3 times daily as needed for Anxiety, Disp: 90 tablet, Rfl: 2    medroxyPROGESTERone (PROVERA) 2.5 MG tablet, Take 4 tablets by mouth daily for 7 days, Disp: 28 tablet, Rfl: 0    VENTOLIN  (90 Base) MCG/ACT inhaler, INHALE 1 TO 2 PUFFS BY MOUTH EVERY 4 TO 6 HOURS AS NEEDED, Disp: , Rfl:     folic acid (FOLVITE) 800 MCG tablet, Take 1 tablet by mouth daily, Disp: , Rfl:     B Complex-C (B COMPLEX-VITAMIN C PO), Take 300 mg by mouth daily, Disp: , Rfl:     COLLAGEN-VITAMIN C PO,

## 2025-02-25 NOTE — PROGRESS NOTES
PROBLEM VISIT     Date of service: 2025    Kavya Stone  Is a 35 y.o. single female    PT's PCP is: Nile Murillo APRN - CNP     : 1990                                             Subjective:       No LMP recorded.   OB History    Para Term  AB Living   0             SAB IAB Ectopic Molar Multiple Live Births                      Social History     Tobacco Use   Smoking Status Every Day    Current packs/day: 0.50    Average packs/day: 0.5 packs/day for 9.0 years (4.5 ttl pk-yrs)    Types: Cigarettes    Passive exposure: Current   Smokeless Tobacco Never        Social History     Substance and Sexual Activity   Alcohol Use Not Currently       Allergies: Buspar [buspirone] and Penicillin g      Current Outpatient Medications:     buprenorphine-naloxone (SUBOXONE) 8-2 MG SUBL SL tablet, place 1 tablet UNDER THE TONGUE TWICE DAILY, Disp: , Rfl:     cloNIDine (CATAPRES) 0.1 MG tablet, Take 1 tablet by mouth nightly, Disp: 30 tablet, Rfl: 2    hydrOXYzine HCl (ATARAX) 50 MG tablet, Take 1 tablet by mouth 3 times daily as needed for Anxiety, Disp: 90 tablet, Rfl: 2    VENTOLIN  (90 Base) MCG/ACT inhaler, INHALE 1 TO 2 PUFFS BY MOUTH EVERY 4 TO 6 HOURS AS NEEDED, Disp: , Rfl:     folic acid (FOLVITE) 800 MCG tablet, Take 1 tablet by mouth daily, Disp: , Rfl:     B Complex-C (B COMPLEX-VITAMIN C PO), Take 300 mg by mouth daily, Disp: , Rfl:     COLLAGEN-VITAMIN C PO, Take by mouth daily Collagen plus vitamin c 6000 mg daily, Disp: , Rfl:     ibuprofen (ADVIL;MOTRIN) 200 MG tablet, Take 2 tablets by mouth every 6 hours as needed for Pain, Disp: , Rfl:     Social History     Substance and Sexual Activity   Sexual Activity Not on file       Last Yearly:      Last pap:     Last HPV: unknown    Chief Complaint   Patient presents with    Amenorrhea     Pt presents today with c/o no cycle in a year. No hx of pcos. Pt was told she doesn't ovulate. Pt was on metformin at one point and

## 2025-03-01 LAB — CYTOLOGY REPORT: NORMAL

## 2025-06-21 ENCOUNTER — APPOINTMENT (OUTPATIENT)
Dept: CT IMAGING | Age: 35
End: 2025-06-21
Payer: MEDICAID

## 2025-06-21 ENCOUNTER — APPOINTMENT (OUTPATIENT)
Dept: GENERAL RADIOLOGY | Age: 35
End: 2025-06-21
Payer: MEDICAID

## 2025-06-21 ENCOUNTER — HOSPITAL ENCOUNTER (EMERGENCY)
Age: 35
Discharge: HOME | End: 2025-06-22
Attending: FAMILY MEDICINE
Payer: MEDICAID

## 2025-06-21 VITALS
TEMPERATURE: 98.9 F | HEIGHT: 63 IN | BODY MASS INDEX: 39.69 KG/M2 | OXYGEN SATURATION: 98 % | WEIGHT: 224 LBS | HEART RATE: 81 BPM | DIASTOLIC BLOOD PRESSURE: 81 MMHG | SYSTOLIC BLOOD PRESSURE: 136 MMHG | RESPIRATION RATE: 17 BRPM

## 2025-06-21 DIAGNOSIS — S00.83XA CONTUSION OF FACE, INITIAL ENCOUNTER: ICD-10-CM
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
## 2025-06-21 DIAGNOSIS — S63.617A SPRAIN OF LEFT LITTLE FINGER, UNSPECIFIED SITE OF DIGIT, INITIAL ENCOUNTER: ICD-10-CM

## 2025-06-21 DIAGNOSIS — S63.502A SPRAIN OF LEFT WRIST, INITIAL ENCOUNTER: ICD-10-CM

## 2025-06-21 DIAGNOSIS — S09.90XA TRAUMATIC INJURY OF HEAD, INITIAL ENCOUNTER: ICD-10-CM

## 2025-06-21 PROCEDURE — 6370000000 HC RX 637 (ALT 250 FOR IP): Performed by: FAMILY MEDICINE

## 2025-06-21 PROCEDURE — 70486 CT MAXILLOFACIAL W/O DYE: CPT

## 2025-06-21 PROCEDURE — 6360000002 HC RX W HCPCS: Performed by: FAMILY MEDICINE

## 2025-06-21 PROCEDURE — 96372 THER/PROPH/DIAG INJ SC/IM: CPT

## 2025-06-21 PROCEDURE — 73110 X-RAY EXAM OF WRIST: CPT

## 2025-06-21 PROCEDURE — 70450 CT HEAD/BRAIN W/O DYE: CPT

## 2025-06-21 PROCEDURE — 73130 X-RAY EXAM OF HAND: CPT

## 2025-06-21 PROCEDURE — 99284 EMERGENCY DEPT VISIT MOD MDM: CPT

## 2025-06-21 RX ORDER — KETOROLAC TROMETHAMINE 30 MG/ML
30 INJECTION, SOLUTION INTRAMUSCULAR; INTRAVENOUS ONCE
Status: COMPLETED | OUTPATIENT
Start: 2025-06-21 | End: 2025-06-21

## 2025-06-21 RX ORDER — CLONIDINE HYDROCHLORIDE 0.1 MG/1
0.1 TABLET ORAL NIGHTLY
COMMUNITY
End: 2025-06-21

## 2025-06-21 RX ORDER — ACETAMINOPHEN 500 MG
1000 TABLET ORAL ONCE
Status: COMPLETED | OUTPATIENT
Start: 2025-06-21 | End: 2025-06-21

## 2025-06-21 RX ADMIN — ACETAMINOPHEN 1000 MG: 500 TABLET, FILM COATED ORAL at 22:41

## 2025-06-21 RX ADMIN — KETOROLAC TROMETHAMINE 30 MG: 30 INJECTION, SOLUTION INTRAMUSCULAR at 23:12

## 2025-06-21 ASSESSMENT — PAIN DESCRIPTION - DESCRIPTORS
DESCRIPTORS: THROBBING

## 2025-06-21 ASSESSMENT — PAIN SCALES - GENERAL
PAINLEVEL_OUTOF10: 10

## 2025-06-21 ASSESSMENT — PAIN DESCRIPTION - ORIENTATION
ORIENTATION: LEFT

## 2025-06-21 ASSESSMENT — PAIN - FUNCTIONAL ASSESSMENT
PAIN_FUNCTIONAL_ASSESSMENT: PREVENTS OR INTERFERES SOME ACTIVE ACTIVITIES AND ADLS
PAIN_FUNCTIONAL_ASSESSMENT: ACTIVITIES ARE NOT PREVENTED
PAIN_FUNCTIONAL_ASSESSMENT: PREVENTS OR INTERFERES SOME ACTIVE ACTIVITIES AND ADLS

## 2025-06-21 ASSESSMENT — PAIN DESCRIPTION - PAIN TYPE
TYPE: ACUTE PAIN
TYPE: ACUTE PAIN

## 2025-06-22 RX ORDER — KETOROLAC TROMETHAMINE 10 MG/1
10 TABLET, FILM COATED ORAL EVERY 6 HOURS PRN
Qty: 10 TABLET | Refills: 0 | Status: SHIPPED | OUTPATIENT
Start: 2025-06-22

## 2025-06-22 ASSESSMENT — PAIN SCALES - GENERAL: PAINLEVEL_OUTOF10: 9

## 2025-06-22 NOTE — ED NOTES
Pt left arm elevated on pillow with ice applied at this time. Pt states that she has slight relief in pain after analgesic admin, see eMAR. States pain 9/10 at this time. Dr. Riley updated on such.

## 2025-06-22 NOTE — ED NOTES
Velcro wrist splint applied to pt left wrist and blue mesh/metal splint applied left little finger and lindsey taped to left ring finger.
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
## (undated) DEVICE — TROCARS: Brand: KII® BALLOON BLUNT TIP SYSTEM

## (undated) DEVICE — Device

## (undated) DEVICE — ELECTRO LUBE IS A SINGLE PATIENT USE DEVICE THAT IS INTENDED TO BE USED ON ELECTROSURGICAL ELECTRODES TO REDUCE STICKING.: Brand: KEY SURGICAL ELECTRO LUBE

## (undated) DEVICE — Z DISCONTINUED USE 2272114 DRAPE SURG UTIL 26X15 IN W/ TAPE N INVASIVE MULTLYR DISP

## (undated) DEVICE — WARMER SCP 2 ANTIFOG LAP DISP

## (undated) DEVICE — 3M™ PRECISE™ VISTA DISPOSABLE SKIN STAPLER 3995: Brand: 3M™ PRECISE™

## (undated) DEVICE — 3M™ TEGADERM™ +PAD FILM DRESSING WITH NON-ADHERENT PAD, 3587, 3-1/2 IN X 4-1/8 IN (9 CM X 10.5 CM), 25/CAR, 4 CAR/CS: Brand: 3M™ TEGADERM™

## (undated) DEVICE — BAG SPEC REM 224ML W4XL6IN DIA10MM 1 HND GYN DISP ENDOPCH

## (undated) DEVICE — ARM DRAPE

## (undated) DEVICE — INSUFFLATION NEEDLE TO ESTABLISH PNEUMOPERITONEUM.: Brand: INSUFFLATION NEEDLE

## (undated) DEVICE — DRESSING SURESITE-123PLUSPAD 2.4 IN X2.8 IN

## (undated) DEVICE — SOLUTION IV IRRIG POUR BRL 0.9% SODIUM CHL 2F7124

## (undated) DEVICE — GLOVE ORANGE PI 7 1/2   MSG9075

## (undated) DEVICE — 40595 XL TRENDELENBURG POSITIONING KIT: Brand: 40595 XL TRENDELENBURG POSITIONING KIT

## (undated) DEVICE — PENCIL ES L3M BTTN SWCH HOLSTER W/ BLDE ELECTRD EDGE

## (undated) DEVICE — BLADELESS OBTURATOR: Brand: WECK VISTA

## (undated) DEVICE — PLUMEPORT LAPAROSCOPIC SMOKE FILTRATION DEVICE: Brand: PLUMEPORT ACTIV

## (undated) DEVICE — INTENDED FOR TISSUE SEPARATION, AND OTHER PROCEDURES THAT REQUIRE A SHARP SURGICAL BLADE TO PUNCTURE OR CUT.: Brand: BARD-PARKER ® STAINLESS STEEL BLADES

## (undated) DEVICE — SUTURE SZ 0 27IN 5/8 CIR UR-6  TAPER PT VIOLET ABSRB VICRYL J603H

## (undated) DEVICE — CANNULA SEAL

## (undated) DEVICE — SYRINGE MED 10ML LUERLOCK TIP W/O SFTY DISP